# Patient Record
(demographics unavailable — no encounter records)

---

## 2018-03-09 NOTE — MRI
MRI CERVICAL SPINE WITHOUT CONTRAST:

 

History 

Back pain.  Bilateral arm and shoulder pain.

 

COMPARISON: 

6/24/16.

 

TECHNIQUE: 

MRI cervical spine is performed without intravenous Gadolinium administration.  Multisequential, mult
iplanar imaging is performed.

 

FINDINGS: 

Evaluation is limited by motion degradation.

 

Appropriate T1 marrow signal intensity of the cervical vertebrae.  Cervical spine vertebral body heig
ht is maintained.  No significant STIR hyperintensity to suggest edema or ligamentous injury.

 

Visualized brain parenchyma, cervicomedullary junction, cervical cord, and the upper thoracic cord ha
ve an overall normal size and signal intensity.

 

C2-C3:  No significant disk-osteophyte complex.  No significant central canal stenosis.  Right neural
 foramen is mildly narrowed due to degenerative change of the uncovertebral joint.  The left neural f
oramen is patent.

 

C3-C4:  Broad-based disk-osteophyte complex abuts the thecal sac.  Ventral subarachnoid space is near
ly effaced.  Mild deformity of the midline cervical cord, without T2 hyperintensity of the cord.  Mil
d central canal stenosis.  Degenerative change of bilateral uncovertebral joints results in moderate 
bilateral foraminal narrowing.

 

C4-C5:  Central disk-osteophyte complex abuts the thecal sac.  The ventral subarachnoid space is main
tained.  Mild central canal stenosis.  No T2 hyperintensity of the cord.  Degenerative change of bila
teral uncovertebral joints results in mild bilateral foraminal narrowing.

 

C5-C6:  Central disk-osteophyte complex abuts the thecal sac.  Mild central canal stenosis.  Degenera
tive change of bilateral uncovertebral joints results in mild to moderate bilateral foraminal narrowi
ng.

 

C6-C7:  Generalized disk-osteophyte complex without significant central canal stenosis.  Degenerative
 change of bilateral uncovertebral joints results in moderate bilateral foraminal narrowing.

 

C7-T1:  No significant central canal stenosis.  Neural foramen are patent.

 

IMPRESSION: 

Limited evaluation due to motion degradation.  Degenerative changes in the cervical spine as above.

 

POS: [facility]

## 2018-10-19 NOTE — BD
DEXA BONE DENSITY STUDY:

 

HISTORY: 

A 79-year-old female with a history of osteoporosis.

 

Lumbar Spine:       BMD (g/cm2)

    L1                1.043              T-Score: +0.5

    L2                1.129              T-Score: +0.9

    L3                1.232              T-Score: +1.3

    L4                1.274              T-Score: +1.9

 

    L1-L4             1.180              T-Score: +1.2

 

Within normal limits with no increased risk for fracture, bone mineral density has decreased 8.7% fro
m the prior study of 12/22/2015.

 

Femoral Neck:         0.779              T-Score: -0.6

 

Total Femur:          1.027              T-Score: +0.7

 

Within normal limits with no increased risk for fracture, bone mineral density has decreased 5.4% fro
m the prior 12/22/2015.

 

FRAX score major osteoporotic fracture 9.7%, hip fracture 1.5%.

 

POS: [facility]

## 2018-10-31 NOTE — HP
HISTORY OF PRESENT ILLNESS:  The patient is a 79-year-old white female who underwent left total knee 
replacement for osteonecrosis of the left knee in 07/2017.  She initially did well.  At 1 year follow
up she was asymptomatic, was quite happy with the results.  Approximately 1 month ago she had a massa
ge and several days later noticed pain in her left knee.  She also relates that approximately 2 weeks
 prior to this, she did have a dental cleaning without antibiotic prophylaxis.  She was subsequently 
seen in my office and the knee was slightly warm and slightly red with an effusion.  Peripheral white
 count was normal, but her sedimentation rate was elevated at 68 and CRP was elevated at 4.14.  Subse
quent aspirate of the knee revealed an intra-articular white count of 9300 with 83 segs.  A positive 
Synasure test consistent with periprosthetic infection.  Cultures did not grow anything.  She has con
tinued to have pain and swelling and feels there has been a definite change since earlier in the summ
er.  It is felt that she does have an infection and is admitted at this time for a revision.

 

PAST MEDICAL HISTORY:  Please see the old chart.  The patient also has steroid dependent autoimmune e
ncephalitis and has been on prednisone for some time 0 mg daily.  She also has a history of hypertens
ion, thyroid disease, neurogenic bladder, a previous hysterectomy and cholecystectomy.

 

CURRENT MEDICATIONS:  Synthroid, Lexapro, pantoprazole, multivitamins, Lasix, valsartan, prednisone, 
aspirin 81 mg daily and gabapentin.

 

ALLERGIES:  She is allergic to SULFA and LEVAQUIN.

 

FAMILY HISTORY/SOCIAL HISTORY/REVIEW OF SYSTEMS:  Essentially unremarkable.  The patient is still usi
ng a walker or cane following her surgery because of altered balance from her encephalitis.

 

PHYSICAL EXAMINATION:

GENERAL:  Reveals a healthy female.

HEENT:  Unremarkable.

NECK:  Supple.

CHEST:  Clear.

HEART:  Regular rate and rhythm.

ABDOMEN:  Soft, nontender.

PELVIC/RECTAL/BREAST:  Exams are deferred.

EXTREMITIES:  Pertinent findings related to the left knee.  There is a trace effusion.  Slight anteri
or erythema.  There is some mild global tenderness.  The surgical wound is well healed.  She is able 
to do a good straight leg raise and has range of motion of 0-120 degrees.  There is no instability.  
Neurovascular exam is intact.

 

X-RAY FINDINGS:  X-rays reveal satisfactory alignment of the components.  No obvious signs of looseni
ng and no obvious signs of osteomyelitis.  

 

Blood studies and arthrocentesis as noted above. 

 

IMPRESSION:  

1.  Infected left total knee replacement.

2.  History of steroid dependent autoimmune encephalitis.

3.  History of hypertension.

4.  History of thyroid replacement.

 

PLAN:  Left total knee revision with removal of the implants and placement of antibiotic spacers.  We
 will not give preoperative antibiotics and will obtain deep tissue cultures and also obtain Infectio
us Disease consultation postoperatively.  The nature of the surgery, length of recovery, and potentia
l complications such as persistent infection, persistent pain, loss of motion, thromboembolic phenome
na, neurovascular injury, possible transfusion, fracture, and need for additional treatment or possib
le revision of the spacers has been discussed in detail with the patient and her family.

## 2018-11-01 NOTE — PDOC.PN
- Subjective


Encounter Start Date: 11/01/18


Encounter Start Time: 19:56


Subjective: seen & examined.Care discussed w family at bedside


-: s/p L TKR for Infection of prosthesis.Feels OK except for pain at surgical 


-: no N/V.no CP/SOB





PMH of HTN,hypothyroidism,Autoimmune encephalitis on Prednisone





- Objective


MAR Reviewed: Yes


Vital Signs & Weight: 


 Vital Signs (12 hours)











  Temp Pulse Resp BP Pulse Ox


 


 11/01/18 16:00  97.7 F  92  18  153/68 H  96








 Weight











Weight                         195 lb














I&O: 


 











 10/31/18 11/01/18 11/02/18





 06:59 06:59 06:59


 


Intake Total   750


 


Output Total   400


 


Balance   350











Additional Labs: 





Microbiology





11/01/18 12:03   Knee - E swab   Acid Fast Bacilli Smear - Final


11/01/18 12:03   Knee - E swab   Acid Fast Bacilli Culture - Final


11/01/18 12:03   Knee - E swab   Acid Fast Bacilli Smear - Final


11/01/18 12:03   Knee - E swab   Acid Fast Bacilli Culture - Final


11/01/18 12:03   Knee - Tissue   Bacterial Culture - Preliminary


11/01/18 12:03   Knee - Tissue   Bacterial Culture - Preliminary


11/01/18 12:03   Knee - E swab   Bacterial Culture - Preliminary


11/01/18 12:03   Knee - E swab   Bacterial Culture - Preliminary











Phys Exam





- Physical Examination


Constitutional: NAD


HEENT: PERRLA, moist MMs, sclera anicteric, oral pharynx no lesions


Neck: no nodes, no JVD, supple, full ROM


Respiratory: no wheezing, no rales, no rhonchi, clear to auscultation bilateral


Cardiovascular: RRR, no significant murmur, no rub


Gastrointestinal: soft, non-tender, no distention, positive bowel sounds


Musculoskeletal: pulses present, edema present (mild pedal edema)


Neurological: non-focal, normal sensation, moves all 4 limbs


Psychiatric: normal affect, A&O x 3


Skin: no rash





Dx/Plan


(1) SHARLENE (acute kidney injury)


Code(s): N17.9 - ACUTE KIDNEY FAILURE, UNSPECIFIED   Status: Acute   





(2) Infected prosthetic knee joint


Code(s): T84.59XA - INFECT/INFLM REACTION DUE TO OTH INTERNAL JOINT PROSTH, INIT

; Z96.659 - PRESENCE OF UNSPECIFIED ARTIFICIAL KNEE JOINT   Status: Acute   





(3) HTN (hypertension)


Code(s): I10 - ESSENTIAL (PRIMARY) HYPERTENSION   Status: Chronic   





(4) Hypothyroid


Code(s): E03.9 - HYPOTHYROIDISM, UNSPECIFIED   Status: Chronic   





(5) H/O autoimmune encephalitis


Status: Chronic   Comment: Continue Prednisone   





- Plan


plan discussed w/ family, continue antibiotics, PT/OT, respiratory therapy, 

incentive spirometry, out of bed/ambulate, DVT proph w/SCDs


Agree w broad spectrum IV ABx. ID consulted as well


-: S/P left knee revision.


-: HD stable


-: Hold lasix tonight as Cr high yesterday.cont IVF.recheck in am


-: Home meds as started. Pain control.Rehab





* .IM team will follow








Review of Systems





- Review of Systems


Constitutional: negative: fever, chills, sweats, weakness, malaise, other


ENT: negative: Ear Pain, Ear Discharge, Nose Pain, Nose Discharge, Nose 

Congestion, Mouth Pain, Mouth Swelling, Throat Pain, Throat Swelling, Other


Respiratory: negative: Cough, Dry, Shortness of Breath, Hemoptysis, SOB with 

Excertion, Pleuritic Pain, Sputum, Wheezing


Cardiovascular: negative: chest pain, palpitations, orthopnea, paroxysmal 

nocturnal dyspnea, edema, light headedness, other


Gastrointestinal: negative: Nausea, Vomiting, Abdominal Pain, Diarrhea, 

Constipation, Melena, Hematochezia, Other


Genitourinary: negative: Dysuria, Frequency, Incontinence, Hematuria, Retention

, Other


Musculoskeletal: Leg Pain.  negative: Neck Pain, Shoulder Pain, Arm Pain, Back 

Pain, Hand Pain, Foot Pain, Other


Neurological: negative: Weakness, Numbness, Incoordination, Change in Speech, 

Confusion, Seizures, Other





- Medications/Allergies


Allergies/Adverse Reactions: 


 Allergies











Allergy/AdvReac Type Severity Reaction Status Date / Time


 


levofloxacin [From Levaquin] Allergy Intermediate Rash Verified 10/31/18 11:00


 


Sulfa (Sulfonamide Allergy Intermediate Rash Verified 10/31/18 11:00





Antibiotics)     











Medications: 


 Current Medications





Acetaminophen (Tylenol)  1,000 mg PO Q6H PRN


   PRN Reason: Mild Pain (1-3)


Hydrocodone Bitart/Acetaminophen (Norco 10/325)  1 tab PO Q4H PRN


   PRN Reason: Pain (1-3)


   Last Admin: 11/01/18 16:32 Dose:  1 tab


Hydrocodone Bitart/Acetaminophen (Norco 10/325)  2 tab PO Q4H PRN


   PRN Reason: PAIN (4-6)


Aspirin (Ecotrin)  81 mg PO BID [facility]


Benzonatate (Tessalon)  100 mg PO Q6H PRN


   PRN Reason: Cough


Bisacodyl (Dulcolax)  10 mg LA DAILYPRN PRN


   PRN Reason: Constipation


Cholecalciferol (Vitamin D3)  2,000 units PO DAILY [facility]


Clonidine (Catapres)  0.1 mg PO Q4H PRN


   PRN Reason: SBP >160 ____


Escitalopram Oxalate (Lexapro)  20 mg PO DAILY [facility]


Fentanyl (Sublimaze)  50 mcg IV Q1H PRN


   PRN Reason: Breakthrough Pain


   Last Admin: 11/01/18 18:35 Dose:  50 mcg


Ferrous Gluconate (Fergon)  324 mg PO BID-[facility]


Guaifenesin (Robitussin Sf)  200 mg PO Q4H PRN


   PRN Reason: Cough


Hydralazine HCl (Apresoline)  10 mg SLOW IVP Q4H PRN


   PRN Reason: SBP > 180 and HR < 70


Ropivacaine 250 ml/ Device  250 mls @ 10 mls/hr NERVE BLCK INF [facility]


Sodium Chloride (Normal Saline 0.9%)  1,000 mls @ 100 mls/hr IV .Q10H [facility]


   Last Admin: 11/01/18 16:29 Dose:  1,000 mls


Vancomycin HCl 1.5 gm/ Sodium (Chloride)  300 mls @ 200 mls/hr IVPB Q12HR [facility]


Cefazolin Sodium/Dextrose 2 gm (/ Device)  50 mls @ 100 mls/hr IVPB 0400,1200,

2000 [facility]


Iron/Minerals/Multivitamins (Theragran M)  1 tab PO DAILY [facility]


Levothyroxine Sodium (Synthroid)  100 mcg PO 0600 [facility]


Magnesium Hydroxide (Milk Of Magnesium)  30 ml PO DAILYPRN PRN


   PRN Reason: Constipation


Nitroglycerin (Nitrostat)  0.4 mg SL Q5MIN PRN


   PRN Reason: Chest Pain


Olmesartan (Benicar)  40 mg PO DAILY [facility]


Ondansetron HCl (Zofran)  4 mg IVP Q6H PRN


   PRN Reason: Nausea/Vomiting


Ondansetron HCl (Zofran)  4 mg IM Q6H PRN


   PRN Reason: Nausea/Vomiting


Pantoprazole Sodium (Protonix)  40 mg PO 2100 [facility]


Prednisone (Prednisone)  10 mg PO DAILY [facility]


Promethazine HCl (Phenergan)  12.5 mg IM Q4H PRN


   PRN Reason: Nausea


Senna/Docusate Sodium (Senokot S)  2 tab PO BID [facility]


Sodium Biphosphate/Sodium Phosphate (Fleet Enema)  133 ml LA DAILYPRN PRN


   PRN Reason: Constipation


Sodium Chloride (Flush - Normal Saline)  10 ml IVF PRN PRN


   PRN Reason: Saline Flush


Sodium Chloride (Ocean Nasal Spray 0.65%)  0 ml EA NARE QIDPRN PRN


   PRN Reason: Nasal Congestion


Throat Lozenges (Cepastat Lozenges)  1 natalie PO Q2H PRN


   PRN Reason: Sore Throat


Tramadol HCl (Ultram)  50 mg PO Q6H PRN


   PRN Reason: Mild Pain (1-3)


Tramadol HCl (Ultram)  100 mg PO Q6H PRN


   PRN Reason: Moderate Pain 4-6


Zolpidem Tartrate (Ambien)  5 mg PO HSPRN PRN


   PRN Reason: Insomnia

## 2018-11-01 NOTE — OP
DATE OF PROCEDURE:  11/01/2018

 

SURGEON:  Amrik Ramírez M.D.

 

ASSISTANT:  Yoseph Gordon M.D. and also HERMANN Ventura (for wound closure).

 

PREOPERATIVE DIAGNOSIS:  Infected left total knee replacement.

 

POSTOPERATIVE DIAGNOSIS:  Infected left total knee replacement.

 

PROCEDURE:  Left total knee revision with antibiotic articulated spacers with 4 
[place] Triathlon femoral component and #4 [place] Triathlon polyethylene 
tibial component, 13 mm thickness.

 

INDICATIONS:  The patient is approximately 15 months status post left total 
knee replacement, which initially was uneventful.  Several weeks ago, she 
developed signs and symptoms of low-grade infection, which was documented by 
blood studies and arthrocentesis.  Blood culture was negative, but no other 
signs pointing to infection.  She is admitted at this time for revision.  So, 
planned to revise the knee with articulated antibiotic impregnated spacers, and 
if she is functional with this, we will leave this in as a single-stage 
revision.  If this fails or does not stay secured, she may require a more 
definitive revision, once the infection is totally eradicated.

 

NARRATIVE REPORT:  After satisfactory anesthesia was induced in supine position
, the patient was prepped and draped in routine manner.  Sequential compression 
device was used on the non-operative leg throughout the procedure.  Antibiotics 
were not given prior to the incision, waiting on obtaining definitive deep 
cultures.  The left leg was elevated, exsanguinated with an Esmarch bandage, 
and tourniquet inflated to 300 mmHg.  The previous medial parapatellar incision 
was reopened in line with the previous incision and slightly extended 
proximally and distally.  This was carried down subcutaneous tissues.  Bleeding 
points controlled appropriately with cautery.  Medial parapatellar arthrotomy 
was performed.  There was cloudy chronic inflamed tissue and fluid was 
obtained.  Fluid and deep cultures of the synovium were sent for Gram stain, 
aerobic and anaerobic cultures, fungal cultures, and AFB cultures.  Medial 
parapatellar arthrotomy was performed.  Patella still subluxed laterally.  A 
subtotal synovectomy was performed using sharp and blunt dissection.  The 
femoral component was then removed with the use of a reciprocal saw and 
osteotomes, taking care to preserve as much bone as possible; it was removed 
without too much difficulty.  There was no gross loosening.  The tibial 
polyethylene was snapped into the tray and the tibial tray removed in a similar 
fashion.  It was considered more difficult, because of the cemented stem of the 
component, but this was accomplished.  There was a very slight fragmentation of 
the anterior lateral rim of the tibia, but this does not appear to be a major 
problem.  The patellar prosthesis removed with an oscillating saw and the 
remaining pegs removed from the holes with the use of a drill bit and curette.  
The remaining of the wound was inspected.  Any additional inflammatory tissue 
and synovitis was widely excised, appeared to be satisfactory debridement.  The 
wound was thoroughly irrigated with pulsatile lavage.  A trial reduction with a 
4 Triathlon femoral component and a #4 tibial baseplate with 13 mm thickness 
gave appropriate size, fit, and stability.  I elected to not replace the 
patellar component and leave it as is.  The trial components removed.  The knee 
was again copiously irrigated.  The tibial and femoral components were then 
cemented in separate stages.  Each component was cemented in with 1 package of 
cement premixed with 2 grams of tobramycin powder and 3 grams of vancomycin 
powder.  The cement was allowed to get to a doughy stage and it was cemented 
with a quite doughy attempting not to get a firm cement mantle.  Excess cement 
was removed.  There again appeared to be good fit and stability of the 
components.  The knee was placed in approximately 10 degrees of flexion.  This 
was felt to be acceptable to allow for further stability and prevent _____ 
postoperatively.  The wound was again copiously irrigated.  The medial 
retinaculum and quadriceps mechanism were closed with interrupted #2 Vicryl and 
a running #2 Quill.  Subcutaneous tissues were closed with running 0 Quill 
suture and the skin closed with staple gun.  Sterile bulky compressive dressing 
was applied and the tourniquet deflated after 95 minutes.  The foot promptly 
pinked up.  Sequential compression device was applied to the operated leg and 
she was placed into the nail immobilizer, awakened, and taken to recovery room 
in stable condition.  There were no apparent intraoperative complications.  The 
estimated blood loss was negligible.

 

It should be mentioned that, after the cultures were obtained, she was given 2 
grams of IV Ancef and 1.5 grams of IV vancomycin.  It is planned to continue 
these postoperatively, pending culture results.

 

MAY[facility]

## 2018-11-01 NOTE — RAD
TWO VIEWS OF THE LEFT KNEE:

 

COMPARISON: 

6/26/2017.

 

HISTORY: 

Status post knee arthroplasty.

 

FINDINGS: 

Two views of the left knee show the patient to be status post left knee arthroplasty without perihard
ware lucency or fracture.  The tibial portion of the prosthesis has been removed.  Air in the soft ti
ssues and overlying skin staples are from recent surgery.

 

IMPRESSION: 

Removal of tibial portion of the prosthesis.

 

POS: [facility]

## 2018-11-02 NOTE — PDOC.PN
- Subjective


Encounter Start Date: 11/02/18


Encounter Start Time: 14:56


Subjective: no new complaints


-: still w some Leg pain at surgical site





- Objective


MAR Reviewed: Yes


Vital Signs & Weight: 


 Vital Signs (12 hours)











  Temp Pulse Resp BP BP BP Pulse Ox


 


 11/02/18 12:18  98.4 F  84  18    122/70  94 L


 


 11/02/18 08:47     128/74  118/66  


 


 11/02/18 08:46     128/74  118/66  


 


 11/02/18 08:43        95


 


 11/02/18 08:28  98.1 F  72  17    111/65  95


 


 11/02/18 04:54  98.2 F  71  18    131/78  98














  Pulse Ox


 


 11/02/18 12:18 


 


 11/02/18 08:47  93 L


 


 11/02/18 08:46  93 L


 


 11/02/18 08:43 


 


 11/02/18 08:28 


 


 11/02/18 04:54 








 Weight











Weight                         195 lb














I&O: 


 











 11/01/18 11/02/18 11/03/18





 06:59 06:59 06:59


 


Intake Total  750 


 


Output Total  400 


 


Balance  350 











Result Diagrams: 


 11/02/18 04:27





 11/02/18 04:27


Additional Labs: 





 Laboratory Tests











  10/31/18 11/02/18





  11:38 04:27


 


Hgb  11.3 L  9.6 L














Phys Exam





- Physical Examination


Constitutional: NAD


HEENT: PERRLA, moist MMs, sclera anicteric, oral pharynx no lesions


Neck: no nodes, no JVD, supple, full ROM


Respiratory: no wheezing, no rales, no rhonchi, clear to auscultation bilateral


Cardiovascular: RRR, no significant murmur, no rub


Gastrointestinal: soft, non-tender, no distention, positive bowel sounds


Musculoskeletal: no edema, pulses present


Neurological: non-focal, normal sensation, moves all 4 limbs


Psychiatric: normal affect, A&O x 3


Skin: no rash





Dx/Plan


(1) SHARLENE (acute kidney injury)


Code(s): N17.9 - ACUTE KIDNEY FAILURE, UNSPECIFIED   Status: Acute   





(2) Infected prosthetic knee joint


Code(s): T84.59XA - INFECT/INFLM REACTION DUE TO OTH INTERNAL JOINT PROSTH, INIT

; Z96.659 - PRESENCE OF UNSPECIFIED ARTIFICIAL KNEE JOINT   Status: Acute   





(3) HTN (hypertension)


Code(s): I10 - ESSENTIAL (PRIMARY) HYPERTENSION   Status: Chronic   





(4) Hypothyroid


Code(s): E03.9 - HYPOTHYROIDISM, UNSPECIFIED   Status: Chronic   





(5) H/O autoimmune encephalitis


Status: Chronic   Comment: Continue Prednisone   





- Plan


PT/OT, DVT proph w/SCDs


restart lasix. renal Fx better.monitor


-: H/H lower-likley post op blood loss anemia.monitor


-: Home meds as below.HD stable


-: IV ABx. ID recs 





* .








Review of Systems





- Review of Systems


Constitutional: negative: fever, chills, sweats, weakness, malaise, other


Respiratory: negative: Cough, Dry, Shortness of Breath, Hemoptysis, SOB with 

Excertion, Pleuritic Pain, Sputum, Wheezing


Cardiovascular: negative: chest pain, palpitations, orthopnea, paroxysmal 

nocturnal dyspnea, edema, light headedness, other


Gastrointestinal: negative: Nausea, Vomiting, Abdominal Pain, Diarrhea, 

Constipation, Melena, Hematochezia, Other


Genitourinary: negative: Dysuria, Frequency, Incontinence, Hematuria, Retention

, Other


Musculoskeletal: negative: Neck Pain, Shoulder Pain, Arm Pain, Back Pain, Hand 

Pain, Leg Pain, Foot Pain, Other


Neurological: negative: Weakness, Numbness, Incoordination, Change in Speech, 

Confusion, Seizures, Other





- Medications/Allergies


Allergies/Adverse Reactions: 


 Allergies











Allergy/AdvReac Type Severity Reaction Status Date / Time


 


levofloxacin [From Levaquin] Allergy Intermediate Rash Verified 10/31/18 11:00


 


Sulfa (Sulfonamide Allergy Intermediate Rash Verified 10/31/18 11:00





Antibiotics)     











Medications: 


 Current Medications





Acetaminophen (Tylenol)  1,000 mg PO Q6H PRN


   PRN Reason: Mild Pain (1-3)


Hydrocodone Bitart/Acetaminophen (Norco 10/325)  1 tab PO Q4H PRN


   PRN Reason: Pain (1-3)


   Last Admin: 11/01/18 16:32 Dose:  1 tab


Hydrocodone Bitart/Acetaminophen (Norco 10/325)  2 tab PO Q4H PRN


   PRN Reason: PAIN (4-6)


   Last Admin: 11/02/18 08:43 Dose:  2 tab


Aspirin (Ecotrin)  81 mg PO BID [facility]


   Last Admin: 11/02/18 08:38 Dose:  81 mg


Benzonatate (Tessalon)  100 mg PO Q6H PRN


   PRN Reason: Cough


Bisacodyl (Dulcolax)  10 mg IA DAILYPRN PRN


   PRN Reason: Constipation


Cholecalciferol (Vitamin D3)  2,000 units PO DAILY [facility]


   Last Admin: 11/02/18 08:38 Dose:  2,000 units


Clonidine (Catapres)  0.1 mg PO Q4H PRN


   PRN Reason: SBP >160 ____


Escitalopram Oxalate (Lexapro)  20 mg PO DAILY [facility]


   Last Admin: 11/02/18 08:38 Dose:  20 mg


Fentanyl (Sublimaze)  50 mcg IV Q1H PRN


   PRN Reason: Breakthrough Pain


   Last Admin: 11/02/18 04:42 Dose:  50 mcg


Ferrous Gluconate (Fergon)  324 mg PO BID-[facility]


   Last Admin: 11/02/18 08:38 Dose:  324 mg


Furosemide (Lasix)  20 mg PO DAILY [facility]


Guaifenesin (Robitussin Sf)  200 mg PO Q4H PRN


   PRN Reason: Cough


Hydralazine HCl (Apresoline)  10 mg SLOW IVP Q4H PRN


   PRN Reason: SBP > 180 and HR < 70


Ropivacaine 250 ml/ Device  250 mls @ 10 mls/hr NERVE BLCK INF [facility]


   Last Admin: 11/02/18 14:30 Dose:  250 mls


Sodium Chloride (Normal Saline 0.9%)  1,000 mls @ 100 mls/hr IV .Q10H [facility]


   Last Admin: 11/02/18 07:32 Dose:  1,000 mls


Cefazolin Sodium/Dextrose 2 gm (/ Device)  50 mls @ 100 mls/hr IVPB 0400,1200,

2000 [facility]


   Last Admin: 11/02/18 12:07 Dose:  50 mls


Vancomycin HCl 1.5 gm/ Sodium (Chloride)  300 mls @ 200 mls/hr IVPB Q24HR [facility]


Iron/Minerals/Multivitamins (Theragran M)  1 tab PO DAILY [facility]


   Last Admin: 11/02/18 08:36 Dose:  1 tab


Levothyroxine Sodium (Synthroid)  100 mcg PO 0600 [facility]


   Last Admin: 11/02/18 04:43 Dose:  100 mcg


Magnesium Hydroxide (Milk Of Magnesium)  30 ml PO DAILYPRN PRN


   PRN Reason: Constipation


Nitroglycerin (Nitrostat)  0.4 mg SL Q5MIN PRN


   PRN Reason: Chest Pain


Olmesartan (Benicar)  40 mg PO DAILY [facility]


   Last Admin: 11/02/18 08:38 Dose:  40 mg


Ondansetron HCl (Zofran)  4 mg IVP Q6H PRN


   PRN Reason: Nausea/Vomiting


Ondansetron HCl (Zofran)  4 mg IM Q6H PRN


   PRN Reason: Nausea/Vomiting


Pantoprazole Sodium (Protonix)  40 mg PO 2100 [facility]


   Last Admin: 11/01/18 20:47 Dose:  40 mg


Prednisone (Prednisone)  10 mg PO DAILY [facility]


   Last Admin: 11/02/18 08:36 Dose:  10 mg


Promethazine HCl (Phenergan)  12.5 mg IM Q4H PRN


   PRN Reason: Nausea


Senna/Docusate Sodium (Senokot S)  2 tab PO BID [facility]


   Last Admin: 11/02/18 08:38 Dose:  2 tab


Sodium Biphosphate/Sodium Phosphate (Fleet Enema)  133 ml IA DAILYPRN PRN


   PRN Reason: Constipation


Sodium Chloride (Flush - Normal Saline)  10 ml IVF PRN PRN


   PRN Reason: Saline Flush


Sodium Chloride (Ocean Nasal Spray 0.65%)  0 ml EA NARE QIDPRN PRN


   PRN Reason: Nasal Congestion


Throat Lozenges (Cepastat Lozenges)  1 natalie PO Q2H PRN


   PRN Reason: Sore Throat


Tramadol HCl (Ultram)  50 mg PO Q6H PRN


   PRN Reason: Mild Pain (1-3)


Tramadol HCl (Ultram)  100 mg PO Q6H PRN


   PRN Reason: Moderate Pain 4-6


Zolpidem Tartrate (Ambien)  5 mg PO HSPRN PRN


   PRN Reason: Insomnia

## 2018-11-02 NOTE — SPC
ULTRASOUND GUIDED LEFT UPPER EXTREMITY PICC PLACEMENT:

 

DATE: 11/2/2018.

 

HISTORY: 

Knee infection in need of IV antibiotics.

 

FINDINGS: 

Informed consent was obtained prior to the procedure.

 

Left antecubital fossa prepped and draped in normal sterile fashion.  The skin overlying the left bas
ilic vein anesthetized with 1% buffered Lidocaine.  With direct sonographic guidance, vascular access
 is obtained via the left basilic vein and an 0.018 wire is advanced to the IVC, retracted to the cav
oatrial junction.  Intravascular length is calculated at 41 cm and the PICC was cut accordingly.  The
 needle was removed and replaced with a peelaway sheath.  The PICC is advanced over the wire.  The wi
re and peelaway sheath were removed.  The tip of the catheter overlies the cavoatrial junction.  Both
 ports flush well and the catheter is ready for use.

 

EXPOSURE DATA:

0.3 minutes fluoroscopic time, 2913 mGy*^cm2.

 

IMPRESSION: 

Successful ultrasound-guided left upper extremity PICC placement.

 

POS: OTF

## 2018-11-02 NOTE — CON
DATE OF CONSULTATION:  11/02/2018

 

REASON FOR CONSULTATION:  Left TKR infection.

 

HISTORY OF PRESENT ILLNESS:  A 79-year-old whom I had seen in the past when she presented with enceph
alitis.  She had a negative ID workup and a clear-cut response to corticosteroid administration with 
then the presumption of an autoimmune encephalitis.  Her CSF then showed a 101 WBCs with predominance
 of neutrophils, VDRL was nonreactive and the Bartonella HSV histoplasma, West Nile and Borrelia work
up was negative.  Cryptococcus antigen was negative as well.  No assays for other forms of encephalit
is, particularly arboviral encephalitides was done.  The prevalence of other arboviral encephalitides
 is less significant in the state of [place] and more common in northern latitudes.  The patient after 
continuation of corticosteroids did well, but attempts at decreasing the steroids and discontinuing h
ave been met with problems in terms of patient feeling weak.  About one year and a half ago she was d
iagnosed with osteonecrosis of the left knee and she required a knee replacement which was uneventful
 and she did well until recently when she developed inflammatory changes.  She was seen in Dr. Ramírez'
s office and an effusion was identified.  Sed rate was high.  CRP was 4.14, the aspirate showed 9300 
WBCs with 82% segmented neutrophils, alpha defensin test was positive.    With those results and mind
, Dr. Ramírez remove the implants and we are awaiting on cultures at this point in time.  The patient i
s awake.  Denies any headaches, no change in visual symptoms, sore throat, odynophagia, dysphagia.  N
o dyspnea or chest pain, no cough, no back pain.  No abdominal pain, diarrhea, no genitourinary sympt
oms.  She voids without difficulty.  No neurological symptoms.

 

PAST MEDICAL HISTORY:  Autoimmune encephalitis, osteonecrosis left knee, left knee replacement, hypot
hyroidism, neurogenic bladder, hysterectomy, cholecystectomy, hypertension.

 

ALLERGIES:  SULFA DRUGS and LEVAQUIN.

 

FAMILY HISTORY:  Noncontributory.

 

CURRENT MEDICATIONS:  Tylenol, hydrocodone, Ecotrin, Tessalon, Dulcolax, cefazolin, clonidine, Sublim
aze, levofloxacin, olmesartan, pantoprazole and vancomycin.

 

PHYSICAL EXAMINATION:

VITAL SIGNS:  T-max 98.4, blood pressure 120/70, pulse 84, respirations 18, O2 sat 94% on 2 liters na
sal cannula.

SKIN:  Noncontributory.  Peripheral IV access.  No lymphadenopathy.

HEENT:  Ocular movements conjugate, a little bit of periorbital edema.  Oral cavity normal.

NECK:  Supple.

LUNGS:  Symmetric clear breath sounds.

HEART:  S1, S2, regular rate.  No S3 or S4.

ABDOMEN:  Soft, not distended or tender.  No ascites.  

:  No bladder distention.  She has a Anderson catheter in place. 

EXTREMITIES:  Left knee with a splint which was not removed.  She does not have a drain in place.  Pu
lses 1+ in dorsalis pedis.  She is able to move toes and is able to lift hands from bed.  

NEURO:  She is oriented, follows commands, fairly decent recollection.  Speech appears to be normal.

 

LABORATORY DATA:  White cell count 9.0, hemoglobin 9.6, platelets 244.  Sodium 140, creatinine 1.03, 
glucose 76.  The last urinalysis is from 10/31/2018 with 0-3 WBCs, positive nitrites.  Synovial fluid
 with 9300 WBCs, a predominance of mature neutrophils.  The cultures are pending including AFB and fu
ngal cultures.

 

IMAGING STUDIES:  There is a knee x-ray with removal of the tibial portion of the prosthesis.

 

ASSESSMENT AND DISCUSSION:  History of autoimmune encephalitis on steroids, osteonecrosis left tibia.
  Inflammatory changes noted with a positive alpha defensin test.  The tibial component has been royal
neo.  It is not clear that the femoral component was removed as well.  It looks like both femoral and
 tibial component removed.

 

DISCUSSION:  The patient will have the usual management, awaiting on the culture results and then adj
ust antimicrobial therapy.  PICC line placement treat for a protracted period of time, after that the
n transition to oral suppressive therapy.  The patient to keep the spacer as long as she can tolerate
 it with decent functional results.  Discussed potential adverse reactions from treatment.  The patie
nt and family understood and agreed to management recommendations.

## 2018-11-04 NOTE — PRG
DATE OF SERVICE:  11/04/2018

 

SUBJECTIVE:  Cannot sleep very well earlier.  This was late as 8 and then she was fatigued and was ab
le to sleep a little, this morning feels somewhat better.  No headaches, no visual symptoms, may be s
ometimes she gets some blurred vision temporarily.  No sore throat, odynophagia, dysphagia.  No dyspn
ea or chest pain, no abdominal pain, has a Anderson catheter.  Mild pain in the involved knee.

 

PHYSICAL EXAMINATION:

VITAL SIGNS:  T-max 99.9 yesterday and now is 99.4, blood pressure 170/76, pulse 86, respirations 18,
 O2 sat 96% on 2 liters, little bit of periorbital edema, but does not appear in distress.

HEENT:  Ocular movements conjugate.  Nasal passages patent.  Oral cavity normal.

NECK:  Supple.

LUNGS:  Symmetric clear breath sounds.

CARDIOVASCULAR:  S1, S2, regular rate without murmurs.

ABDOMEN:  Soft, not distended, no bladder distention or organomegaly.  No ascites.  No joint inflamma
tory activity outside the area of involvement.  She is able to wiggle her feet.  She is able to lift 
her hands from the bed and has good  strength.  She is awake, oriented, follows commands.

 

LABORATORY DATA:  White cell count is 9.6, hemoglobin 9.7, platelets 208.  Sodium 137, creatinine 0.8
.  Microbiology with all the samples are pending including acid fast and bacterial.  I am not sure th
at the fungal cultures were submitted.

 

ASSESSMENT AND DISCUSSION:  Prior history of autoimmune encephalitis on low dose steroids with osteon
ecrosis left tibia, status post knee replacement, and now inflammatory changes with a positive alpha 
defensins test with removal of both components.  She has a spacer in place and cultures are thus far 
negative.  The possibility of opportunistic pathogen including fungal and mycobacterial pathogens nee
d to be considered.  In the meantime, continue Rocephin and vancomycin.  If the cultures end up being
 negative, we will continue this regimen for a total of 6 weeks with monitoring of C reactive protein
, CBC and monitoring of the mycobacterial and fungal cultures.  I have to verify with laboratory.  Th
e fungal cultures were actually submitted.

## 2018-11-04 NOTE — PDOC.EVN
Event Note





- Event Note


Event Note: 





Following for medical management


Bp still high.restart AM Norvasc.


replace Potassium.


Follow H/H.


Home meds as before

## 2018-11-05 NOTE — PDOC.PN
- Subjective


Encounter Start Date: 11/05/18


Encounter Start Time: 11:47


Subjective: feels a little better.sat at the side of bed .


-: has rash under breasts,underarm are & in groin


-: no fever/chills/n/v/d





- Objective


MAR Reviewed: Yes


Vital Signs & Weight: 


 Vital Signs (12 hours)











  Temp Pulse Resp BP Pulse Ox


 


 11/05/18 10:14      93 L


 


 11/05/18 07:25  98.4 F  81  22 H  176/77 H  91 L


 


 11/05/18 04:38  98.2 F  83  16  154/80 H  95


 


 11/05/18 00:00  99.3 F  85  16  164/83 H  95








 Weight











Weight                         195 lb














I&O: 


 











 11/04/18 11/05/18 11/06/18





 06:59 06:59 06:59


 


Intake Total  350 730


 


Output Total  1350 1400


 


Balance  -1000 -670











Result Diagrams: 


 11/05/18 04:54





 11/05/18 04:54


Additional Labs: 





Microbiology





11/01/18 12:03   Knee - Tissue   Acid Fast Bacilli Smear - Final


11/01/18 12:03   Knee - Tissue   Acid Fast Bacilli Smear - Final


11/01/18 12:03   Knee - E swab   Acid Fast Bacilli Smear - Final


11/01/18 12:03   Knee - E swab   Acid Fast Bacilli Culture - Final


11/01/18 12:03   Knee - E swab   Acid Fast Bacilli Smear - Final


11/01/18 12:03   Knee - E swab   Acid Fast Bacilli Culture - Final


11/01/18 12:03   Knee - Tissue   Bacterial Culture - Preliminary


11/01/18 12:03   Knee - Tissue   Anaerobic Culture - Preliminary


11/01/18 12:03   Knee - Tissue   Bacterial Culture - Preliminary


11/01/18 12:03   Knee - Tissue   Anaerobic Culture - Preliminary


11/01/18 12:03   Knee - Tissue   Bacterial Culture - Preliminary


11/01/18 12:03   Knee - Tissue   Anaerobic Culture - Preliminary


11/01/18 12:03   Knee - E swab   Bacterial Culture - Preliminary


11/01/18 12:03   Knee - E swab   Anaerobic Culture - Preliminary


11/01/18 12:03   Knee - E swab   Bacterial Culture - Preliminary


11/01/18 12:03   Knee - E swab   Anaerobic Culture - Preliminary


11/01/18 12:03   Knee - E swab   Bacterial Culture - Preliminary


11/01/18 12:03   Knee - E swab   Anaerobic Culture - Preliminary


11/01/18 12:03   Knee - E swab   Bacterial Culture - Preliminary


11/01/18 12:03   Knee - E swab   Anaerobic Culture - Preliminary





 Laboratory Tests











  10/31/18 11/02/18 11/03/18





  11:38 04:27 05:06


 


Hgb  11.3 L  9.6 L  9.7 L














Phys Exam





- Physical Examination


Constitutional: NAD


HEENT: PERRLA, moist MMs, sclera anicteric, oral pharynx no lesions


Neck: no nodes, no JVD, supple, full ROM


Respiratory: no wheezing, no rales, no rhonchi, clear to auscultation bilateral


Cardiovascular: RRR, no significant murmur, no rub


Gastrointestinal: soft, non-tender, no distention, positive bowel sounds


Musculoskeletal: no edema, pulses present


Neurological: non-focal, normal sensation, moves all 4 limbs


Deviation from normal: candidial rash in groin,under breasts and arm pits





Dx/Plan


(1) Candidiasis of breast


Code(s): B37.89 - OTHER SITES OF CANDIDIASIS   Status: Acute   





(2) Acute blood loss anemia


Code(s): D62 - ACUTE POSTHEMORRHAGIC ANEMIA   Status: Acute   Comment: H/H 

stable.monitor.On FeSO4   





(3) Hypokalemia


Code(s): E87.6 - HYPOKALEMIA   Status: Acute   Comment: replace and recheck   





(4) Infected prosthetic knee joint


Code(s): T84.59XA - INFECT/INFLM REACTION DUE TO OTH INTERNAL JOINT PROSTH, INIT

; Z96.659 - PRESENCE OF UNSPECIFIED ARTIFICIAL KNEE JOINT   Status: Acute   





(5) SHARLENE (acute kidney injury)


Code(s): N17.9 - ACUTE KIDNEY FAILURE, UNSPECIFIED   Status: Resolved   





(6) HTN (hypertension)


Code(s): I10 - ESSENTIAL (PRIMARY) HYPERTENSION   Status: Chronic   





(7) Hypothyroid


Code(s): E03.9 - HYPOTHYROIDISM, UNSPECIFIED   Status: Chronic   





(8) H/O autoimmune encephalitis


Status: Chronic   Comment: Continue Prednisone   





- Plan


plan discussed w/ family, continue antibiotics, DVT proph w/SCDs


Add Topical candidiasis.


-: replace and recheck Potassium


-: renal Fx better.


-: monitor H/H.


-: IM team will follow





* .








Review of Systems





- Review of Systems


Constitutional: weakness.  negative: fever, chills, sweats, malaise, other


ENT: negative: Ear Pain, Ear Discharge, Nose Pain, Nose Discharge, Nose 

Congestion, Mouth Pain, Mouth Swelling, Throat Pain, Throat Swelling, Other


Respiratory: negative: Cough, Dry, Shortness of Breath, Hemoptysis, SOB with 

Excertion, Pleuritic Pain, Sputum, Wheezing


Cardiovascular: negative: chest pain, palpitations, orthopnea, paroxysmal 

nocturnal dyspnea, edema, light headedness, other


Skin: Rash


Neurological: negative: Weakness, Numbness, Incoordination, Change in Speech, 

Confusion, Seizures, Other





- Medications/Allergies


Allergies/Adverse Reactions: 


 Allergies











Allergy/AdvReac Type Severity Reaction Status Date / Time


 


levofloxacin [From Levaquin] Allergy Intermediate Rash Verified 10/31/18 11:00


 


Sulfa (Sulfonamide Allergy Intermediate Rash Verified 10/31/18 11:00





Antibiotics)     











Medications: 


 Current Medications





Acetaminophen (Tylenol)  1,000 mg PO Q6H PRN


   PRN Reason: Mild Pain (1-3)


Hydrocodone Bitart/Acetaminophen (Norco 10/325)  1 tab PO Q4H PRN


   PRN Reason: Pain (1-3)


   Last Admin: 11/05/18 08:46 Dose:  1 tab


Hydrocodone Bitart/Acetaminophen (Norco 10/325)  2 tab PO Q4H PRN


   PRN Reason: PAIN (4-6)


   Last Admin: 11/03/18 09:06 Dose:  2 tab


Acidophilus (Floranex)  1 tab PO DAILY [facility]


   Last Admin: 11/05/18 08:46 Dose:  1 tab


Amlodipine Besylate (Norvasc)  5 mg PO QPM [facility]


   Last Admin: 11/04/18 21:29 Dose:  5 mg


Ascorbic Acid (Vitamin C)  500 mg PO DAILY [facility]


   Last Admin: 11/05/18 08:46 Dose:  500 mg


Aspirin (Ecotrin)  81 mg PO BID [facility]


   Last Admin: 11/05/18 08:45 Dose:  81 mg


Benzonatate (Tessalon)  100 mg PO Q6H PRN


   PRN Reason: Cough


Bisacodyl (Dulcolax)  10 mg KS DAILYPRN PRN


   PRN Reason: Constipation


Calcium Carbonate (Oscal-500)  500 mg PO DAILY [facility]


   Last Admin: 11/05/18 08:45 Dose:  500 mg


Cholecalciferol (Vitamin D3)  2,000 units PO DAILY [facility]


   Last Admin: 11/05/18 08:46 Dose:  2,000 units


Clonidine (Catapres)  0.1 mg PO Q4H PRN


   PRN Reason: SBP >160 ____


   Last Admin: 11/03/18 03:52 Dose:  0.1 mg


Escitalopram Oxalate (Lexapro)  20 mg PO DAILY [facility]


   Last Admin: 11/05/18 08:45 Dose:  20 mg


Fentanyl (Sublimaze)  50 mcg IV Q1H PRN


   PRN Reason: Breakthrough Pain


   Last Admin: 11/02/18 19:42 Dose:  50 mcg


Ferrous Gluconate (Fergon)  324 mg PO BID-[facility]


   Last Admin: 11/05/18 08:45 Dose:  324 mg


[facility]  [facility]


Furosemide (Lasix)  20 mg PO DAILY [facility]


   Last Admin: 11/05/18 08:46 Dose:  20 mg


Gabapentin (Neurontin)  100 mg PO HS [facility]


   Last Admin: 11/04/18 21:29 Dose:  100 mg


Guaifenesin (Robitussin Sf)  200 mg PO Q4H PRN


   PRN Reason: Cough


Hydralazine HCl (Apresoline)  10 mg SLOW IVP Q4H PRN


   PRN Reason: SBP > 170 and HR < 70


Ropivacaine 250 ml/ Device  250 mls @ 10 mls/hr NERVE BLCK INF [facility]


   Last Admin: 11/05/18 05:54 Dose:  250 mls


Sodium Chloride (Normal Saline 0.9%)  1,000 mls @ 50 mls/hr IV .Q20H [facility]


   Last Admin: 11/05/18 05:28 Dose:  Not Given


Ceftriaxone Sodium 2 gm/ (Sodium Chloride)  100 mls @ 200 mls/hr IVPB Q24HR [facility]


   Last Admin: 11/04/18 13:48 Dose:  100 mls


Vancomycin HCl 1 gm/ Device  200 mls @ 200 mls/hr IVPB Q12HR [facility]


   Last Admin: 11/05/18 08:47 Dose:  200 mls


Iron/Minerals/Multivitamins (Theragran M)  1 tab PO DAILY [facility]


   Last Admin: 11/05/18 08:44 Dose:  1 tab


Ketorolac Tromethamine (Toradol)  15 mg IVP Q6H PRN


   PRN Reason: Pain


   Stop: 11/08/18 09:24


Levothyroxine Sodium (Synthroid)  100 mcg PO 0600 [facility]


   Last Admin: 11/05/18 07:21 Dose:  100 mcg


Magnesium Hydroxide (Milk Of Magnesium)  30 ml PO DAILYPRN PRN


   PRN Reason: Constipation


Nitroglycerin (Nitrostat)  0.4 mg SL Q5MIN PRN


   PRN Reason: Chest Pain


Nystatin (Mycostatin Powder)  1 gm TOP BID [facility]


Olmesartan (Benicar)  40 mg PO DAILY [facility]


   Last Admin: 11/05/18 08:49 Dose:  40 mg


Ondansetron HCl (Zofran)  4 mg IVP Q6H PRN


   PRN Reason: Nausea/Vomiting


Ondansetron HCl (Zofran)  4 mg IM Q6H PRN


   PRN Reason: Nausea/Vomiting


Pantoprazole Sodium (Protonix)  40 mg PO 2100 [facility]


   Last Admin: 11/04/18 21:29 Dose:  40 mg


Phenazopyridine HCl (Azo Standard)  97.5 mg PO Q8H PRN


   PRN Reason: burning with urination


Polyethylene Glycol (Miralax)  17 gm PO DAILY [facility]


   Last Admin: 11/05/18 08:46 Dose:  17 gm


Potassium Chloride (K-Dur)  40 meq PO NOW [facility]


   Stop: 11/05/18 15:00


Prednisone (Prednisone)  10 mg PO DAILY [facility]


   Last Admin: 11/05/18 08:45 Dose:  10 mg


Promethazine HCl (Phenergan)  12.5 mg IM Q4H PRN


   PRN Reason: Nausea


Senna/Docusate Sodium (Senokot S)  2 tab PO BID [facility]


   Last Admin: 11/05/18 08:44 Dose:  2 tab


Sodium Biphosphate/Sodium Phosphate (Fleet Enema)  133 ml KS DAILYPRN PRN


   PRN Reason: Constipation


Sodium Chloride (Flush - Normal Saline)  10 ml IVF PRN PRN


   PRN Reason: Saline Flush


   Last Admin: 11/04/18 09:32 Dose:  10 ml


Sodium Chloride (Ocean Nasal Spray 0.65%)  0 ml EA NARE QIDPRN PRN


   PRN Reason: Nasal Congestion


Throat Lozenges (Cepastat Lozenges)  1 natalie PO Q2H PRN


   PRN Reason: Sore Throat


Tramadol HCl (Ultram)  50 mg PO Q6H PRN


   PRN Reason: Mild Pain (1-3)


Tramadol HCl (Ultram)  100 mg PO Q6H PRN


   PRN Reason: Moderate Pain 4-6


Zolpidem Tartrate (Ambien)  5 mg PO HSPRN PRN


   PRN Reason: Insomnia

## 2018-11-05 NOTE — PRG
DATE OF SERVICE:  11/05/2018

 

SUBJECTIVE:  No pain.  No respiratory symptoms.  No diarrhea.  Still has an 
indwelling Anderson catheter.

 

OBJECTIVE:

HEENT:  Ocular movements conjugate.

LUNGS:  Clear.

HEART:  S1, S2, regular rate.

ABDOMEN:  Soft.

 

LABORATORY DATA:  White cell count is 5.7, hemoglobin 8.9, platelets 244,000.  
Iron 18.  All the cultures thus far are negative.

 

Dr. Ramírez has requested extension of the incubation to 14 days total.

 

ASSESSMENT AND DISCUSSION:  Prior history of autoimmune encephalitis on low 
dose steroids, osteonecrosis of left tibia status post knee replacement and now 
inflammatory process with a positive alpha-defensin test and removal of both 
components.  Cultures are thus far negative and culture incubation will be 
extended to a total of 14 days.  Other methods to improve yield of cultures 
such as direct inoculation synovial fluid.  Sonication of the implants have 
been removed.  Those are not available.  The extension of incubation increases 
about 10% of the yield typically with propionibacterium and peptostreptococcus.
  In her case, we are going to treated her with Rocephin and vancomycin 
anyways.  PICC line will be placed and the date of therapy will be 12/14/2018 
with weekly labs.

 

[facility]

## 2018-11-27 NOTE — SPC
LEFT UPPER EXTREMITY PICC LINE EVALUATION:

11/27/18

 

CLINICAL HISTORY: 

Dysfunctioning left PICC line.

 

FINDINGS:  

Informed consent was obtained from the patient. The patient's indwelling left PICC line was prepped a
nd draped in the standard sterile fashion. Dressing was removed and PICC line was adjusted. Contrast 
injection was performed with a small volume (3 cm) of radiopaque contrast. This did reveal patency an
d appropriate flow within the PICC line catheter and subsequent injection into the cavoatrial region.
 Catheter was flushed and secured to the left upper extremity. Imaging was stored for documentation. 


 

RADIATION EXPOSURE DATA:

190 mGy*cm2. 0 minutes intermittent fluoroscopy.

 

IMPRESSION:  

Technically successful PICC line evaluation of left upper extremity with contrast injection proving p
atency and appropriate flow. 

 

POS: OTF

## 2018-12-03 NOTE — SPC
FLUOROSCOPIC ASSESSMENT OF A LEFT UPPER EXTREMITY PICC:

 

DATE: 12/3/2018.

 

HISTORY: 

Evaluate for PICC function.

 

FINDINGS: 

Under sterile conditions, both ports of the patient's left PICC were flushed and aspirated normally. 
 Then, contrast media was injected into both ports and imaging over the port entry site as well as th
e distal aspect of the port obtained.  No abnormalities are seen at the entry site or at the level of
 th distal tip of the catheter while injecting contrast media.  The tip of the catheter overlies th c
avoatrial junction and is unchanged.

 

IMPRESSION: 

No abnormality noted at access site or distal tip of left upper extremity PICC.

 

If there is any concern for venous thrombus, ultrasound could be performed.

 

POS: OTF

## 2019-03-21 NOTE — CT
CT ABDOMEN AND PELVIS:

 

03/21/2019

 

HISTORY:

Projectile vomiting and nausea.

 

COMPARISON:

04/02/2016

 

TECHNIQUE:

Axial CT imaging at 5 mm intervals, from the lung bases through the pubic symphysis, with oral contra
st media.  Coronal and sagittal reformatted imaging obtained.  No IV contrast provided.

 

FINDINGS:

The lack of IV contrast limits assessment of the vasculature and the viscera and for lymphadenopathy.


 

The imaged lung bases appear grossly unremarkable.  Cholecystectomy clips are present.  Coronary negro
rial calcification is noted.

 

No free intraperitoneal air or fluid.  The uterus appears surgically absent.

 

There is a 2.5 cm cyst in the superior aspect of the liver.  The spleen, pancreas, adrenal glands, an
d kidneys appear grossly unremarkable.  There is no nephrolithiasis or evidence of obstructive uropat
hy on either side.

 

The appendix is visualized and is within normal limits.  No focal area of bowel inflammatory change n
oted.  No evidence for bowel obstruction.  There are scattered atherosclerotic calcifications of the 
abdominal aorta and its branches.

 

There is no lymphadenopathy appreciated within the abdomen or pelvis.

 

Review of the osseous structures demonstrates multilevel lower lumbar spine facet hypertrophy.  There
 is anterolisthesis of L4 on L5, measuring 1 cm.  Disk space narrowing and vacuum disk formation is n
oted at L4-L5 and L5-S1, with bilateral L4-L5 and L5-S1 neural foraminal stenosis.

 

IMPRESSION:

No acute findings are noted.  Numerous incidental findings, as detailed above.

 

POS: OTF

## 2019-06-06 NOTE — RAD
EMAM:

CHEST ONE VIEW:

6/6/19

 

HISTORY: 

Autoimmune disease, vomiting, fever. 

 

COMPARISON: 

9/25/18.

 

FINDINGS: 

Heart size is within normal limits. Mild right hemidiaphragm elevation, stable. No confluent pneumoni
a or overt edema or pleural effusion. 

 

IMPRESSION: 

Stable appearing chest. Atherosclerosis of the aorta. No pneumonia or other acute process. 

 

POS: OFF

## 2019-06-07 NOTE — HP
CHIEF COMPLAINT:  Fever, generalized weakness.



HISTORY OF PRESENT ILLNESS:  The patient is a very pleasant 80-year-old female, with

a history of rheumatoid arthritis, autoimmune encephalitis, hypothyroidism, and

neurogenic bladder, who presents to the hospital with complaints of generalized

weakness and fever x1 day.  Furthermore, the patient states that for about a week,

she has been having significant generalized weakness to the point that she has been

sleeping very much and has been very tired.  She stated that last night she was

nauseated and vomited x1.  Also, she had a temperature of a 101.5 last night.  The

patient denies any diarrhea, any abdominal pain, or any chest pain or shortness of

breath.  The patient states that she has been eating and drinking well; however, she

has just been feeling very tired and weak for the past week.  She normally uses a

walker to ambulate; however, she feels much more weak as compared to her baseline.

The patient normally self caths herself.  She denies any dysuria or any pain to her

lower back or her suprapubic area. 



PAST MEDICAL HISTORY:  She has a history of;

1. Rheumatoid arthritis.

2. Autoimmune encephalitis.

3. Osteonecrosis.

4. Hypothyroidism.

5. Neurogenic bladder.

6. GERD.



PAST SURGICAL HISTORY:  Hysterectomy and cholecystectomy.  She has had a vein

ligation.  She has had a hardware removal from her left knee. 



FAMILY HISTORY:  Unknown history of father, unknown history of mother.



SOCIAL HISTORY:  She is nonsmoker.  No alcohol use.  No drug use.  She is a full

code.  Lives alone. 



ALLERGIES:  SHE IS ALLERGIC TO SULFA AND LEVAQUIN.  SHE GETS A RASH.



MEDICATIONS:  As of the following.  She is on;

1. Penicillin V 250 one p.o. daily.

2. Synthroid 150 daily.

3. Leflunomide 20 mg daily.

4. Lexapro 20 mg daily.

5. Losartan 100 mg daily.

6. Lasix 20 mg daily.

7. Prednisone 9 mg daily.

8. Amlodipine 5 mg daily.

9. Pantoprazole 40 mg daily.

10. Tramadol as needed.



REVIEW OF SYSTEMS:  All negative except for the ones mentioned above in the HPI.



LABORATORY RESULTS:  As of the following; WBCs of 7.3, hemoglobin of 12.3,

hematocrit of 36.9, platelets of 340.  Chemistry; sodium of 136, potassium of 3.7,

BUN of 32, creatinine of 1.98, lactic acid of 3.4, glucose of 176.  Urine; she had

large leukocyte esterase with wbc's and squamous epithelial cells 4 to 6. 



IMAGING STUDIES:  She did have a chest x-ray, which according to my interpretation

did not seem to have any acute abnormalities. 



PHYSICAL EXAMINATION:

VITAL SIGNS:  As of the following; her temperature in the ER was 98.7, 133/81, 89

heart rate, 96% on room air, 18 respirations. 

GENERAL:  She is awake, alert, and oriented x3.  She does not appear in distress. 

HEENT:  Normocephalic, atraumatic.  No lymphadenopathy noted.  Pupils are equal and

reactive to light.  Mucous membranes are moist. 

CV:  S1, S2 present.  No murmurs, rubs, or gallops. 

ABDOMEN:  Soft and nontender.  Bowel sounds are present x2. 

LUNGS:  Clear to auscultation.  No rhonchi or wheezes noted. 

EXTREMITIES:  No edema.  Pedal pulses are present x2. 

NEUROLOGICAL:  No focal deficits noted.  Oriented x3. 

SKIN:  No cuts, lesions, or bruises noted.



ASSESSMENT AND PLAN:  The patient is a very pleasant 80-year-old female who presents

to the hospital with complaints of generalized weakness and fever. 

1. Sepsis, most likely urinary source.  I did review her previous cultures.  Most

likely, she has Escherichia coli; however, given her immunocompromised state, since

she does take Orencia monthly for her rheumatoid and she is on prednisone which was

recently decreased from 10 mg to 9 mg on the 21st of May.  I will start her on some

Zosyn and Infectious Disease has been consulted.  Also, I will start her on some

gentle hydration and we will continue to monitor and urine culture has been sent. 

2. History of hypothyroidism.  We will continue her Synthroid.

3. History of hypertension.  We will continue her losartan and amlodipine.

4. History of rheumatoid arthritis and we will continue her prednisone.  At this

point, she does not require any stress-dose steroids.  If she does become

hypotensive, I will give her stress-dose steroids.  I will hold the penicillin 250

that is a prophylactic medication for her left knee. 

5. Deep venous thrombosis prophylaxis.  We will put the patient on SCDs and Lovenox

subcu. 







Job ID:  [id number]

## 2019-06-07 NOTE — CON
DATE OF CONSULTATION:  06/07/2019



REASON FOR CONSULTATION:  Sepsis.



HISTORY OF PRESENT ILLNESS:  An 80-year-old well known to me from multiple prior

visits both in the hospital as well as the clinic, who has history of autoimmune

encephalitis, which responded to corticosteroids, and osteonecrosis of left knee

with TKR, which unfortunately was affected by inflammatory changes postoperatively.

Implants were removed and the patient I think had the tibial component removed, but

she still has I think replacement there.  It was a one stage procedure and she

received protracted antimicrobial therapy and is on suppressive treatment at this

time.  She also has a neurogenic bladder and does in and out caths every 4 hours

approximately.  She does not measure the postvoid for the past few weeks and has

developed general malaise and deterioration in her functional status.  She has been

recently diagnosed with rheumatoid arthritis by Dr. Hodge and started on Orencia

and now developed progression of her weakness, developed fever up to 103.  Did not

have any headaches.  No visual symptoms.  No sore throat, odynophagia, or dysphagia.

 No more than her usual back pain and neck pain.  No cough, sputum production, or

dyspnea.  No chest pain.  No abdominal pain.  No dysuria.  No joint symptoms outside

the usual.  No neurological symptoms except for some confusional state associated

with weakness.  She has been started on broad-spectrum coverage and cultures are

pending.  Currently, she is feeling about the same, just had a fever again. 



REVIEW OF SYSTEMS:  A 10-point review of system as above.



MEDICAL HISTORY:  

1. Autoimmune encephalitis, on corticosteroids.

2. Rheumatoid arthritis, on Orencia.

3. Osteonecrosis of left knee status post TKR with postoperative infection and

revision. 

4. Neurogenic bladder with in-and-out caths.

5. GERD.



SURGICAL HISTORY:  

1. Hysterectomy.

2. Cholecystectomy.

3. TKR on left side with revision.



FAMILY HISTORY:  Noncontributory.



SOCIAL HISTORY:  Lives by herself, independent.  Never smoker.  No alcoholic

beverage use. 



ALLERGIES:  SULFA AND LEVAQUIN.



CURRENT MEDICATIONS:  

1. Norvasc.

2. Tylenol.

3. Lovenox.

4. Lexapro.

5. Synthroid.

6. Had been on Zosyn, now is on meropenem.

7. Zofran.

8. Protonix.

9. Prednisone 4 mg daily.



PHYSICAL EXAMINATION:

VITAL SIGNS:  T-max 101.2, /70, pulse 81, respirations 20, O2 saturation 96. 

SKIN:  The left knee surgical site with no inflammatory changes.  She has a

peripheral IV access and a Anderson catheter was just inserted. 

LYMPHATICS:  No lymphadenopathy. 

HEENT:  Ocular movements conjugate.  A little bit of periorbital edema.  Oral

cavity, normal. 

NECK:  Supple. 

LUNGS:  Clear to auscultation and percussion. 

HEART:  S1 and S2.  Regular rate.  No S3 or S4. 

ABDOMEN:  Soft, not distended or tender.  No ascites.  No bladder distention. 

EXTREMITIES:  No other joint inflammatory process noted.  She is oriented, follows

commands, appears in no distress.  Speech is normal.  Recall is normal. 



LABORATORY DATA:  White cell count 7.3, hemoglobin 12, MCV 79, platelets 340, 90%

neutrophils.  Hemoglobin now is 9.6, MCV 79, and WBC 3.8.  Sodium 138, creatinine is

at 1.5, which is better than admission when it was 1.9.  GFR is up from 24 to 33.

Liver profile normal.  Albumin 3.9.  Urinalysis with greater than 50 wbc's.  Urine

culture and blood culture are pending. 



IMAGING STUDIES:  We have the previous abdomen and pelvis from March with no acute

findings.  The chest x-ray at this time was normal. 



ASSESSMENT:  

1. History of autoimmune encephalitis.

2. Rheumatoid arthritis, on Orencia.  She is on low-dose prednisone.

3. Neurogenic bladder, on in-and-out caths, now with abnormal urinalysis and fever.



DISCUSSION:  Differential diagnosis includes the more likely scenario of an invasive

UTI.  She did have an ESBL organism in the past, so we will go ahead and switch her

to meropenem until we have the final results of cultures.  The other possibility

would be an opportunistic process in view of the corticosteroids and the Orencia

administration.  We await for the cultures and response to meropenem.  We may have

to submit assays for opportunistic pathogens, but we will wait for the time being. 







Job ID:  [id number]

## 2019-06-08 NOTE — PDOC.PN
- Subjective


Encounter Start Date: 06/08/19


Encounter Start Time: 10:15


Subjective: pt up in bed appears well today, had a bad night 





- Objective


Resuscitation Status - Order Detail:





06/06/19 17:46


Resuscitation Status Routine 


   Resuscitation Status: FULL: Full Resuscitation








Vital Signs & Weight: 


 Vital Signs (12 hours)











  Temp Pulse Pulse Resp BP Pulse Ox Pulse Ox


 


 06/08/19 11:36    92     95


 


 06/08/19 08:00       99 


 


 06/08/19 06:56  98.6 F  100   17  138/71  99 


 


 06/08/19 03:42       98 


 


 06/08/19 03:41       98 








 Weight











Weight                         205 lb 4 oz














I&O: 


 











 06/07/19 06/08/19 06/09/19





 06:59 06:59 06:59


 


Intake Total 1762 2968 


 


Output Total 900 2350 


 


Balance 862 618 











Result Diagrams: 


 06/07/19 04:28





 06/07/19 04:28





Phys Exam





- Physical Examination


Neck: no nodes, no JVD, supple, full ROM


Respiratory: no wheezing, no rales, no rhonchi, wheezing present, clear to 

auscultation bilateral


Cardiovascular: RRR, no significant murmur, no rub, gallop, irregular


Gastrointestinal: soft, non-tender, no distention, positive bowel sounds





Dx/Plan


(1) Sepsis


Code(s): A41.9 - SEPSIS, UNSPECIFIED ORGANISM   Status: Acute   





(2) UTI (urinary tract infection)


Status: Acute   





(3) Fever


Code(s): R50.9 - FEVER, UNSPECIFIED   Status: Acute   





(4) HTN (hypertension)


Code(s): I10 - ESSENTIAL (PRIMARY) HYPERTENSION   Status: Chronic   





(5) Hypothyroid


Code(s): E03.9 - HYPOTHYROIDISM, UNSPECIFIED   Status: Chronic   





- Plan


no fevers, cx indicate serratia cescens


-: will given one more dose of solu cortef


-: will start her back on her cpap


-: pt to evaluate pt





* .








Review of Systems





- Review of Systems


Respiratory: negative: Cough, Dry, Shortness of Breath, Hemoptysis, SOB with 

Excertion, Pleuritic Pain, Sputum, Wheezing


Cardiovascular: negative: chest pain, palpitations, orthopnea, paroxysmal 

nocturnal dyspnea, edema, light headedness, other


Gastrointestinal: negative: Nausea, Vomiting, Abdominal Pain, Diarrhea, 

Constipation, Melena, Hematochezia, Other





- Medications/Allergies


Allergies/Adverse Reactions: 


 Allergies











Allergy/AdvReac Type Severity Reaction Status Date / Time


 


levofloxacin [From Levaquin] Allergy Intermediate Rash Verified 06/06/19 21:28


 


Sulfa (Sulfonamide Allergy Intermediate Rash Verified 06/06/19 21:28





Antibiotics)     











Medications: 


 Current Medications





Acetaminophen (Tylenol)  650 mg PO Q4H PRN


   PRN Reason: Headache/Fever/Mild Pain (1-3)


   Last Admin: 06/07/19 10:28 Dose:  650 mg


Acetaminophen (Tylenol)  650 mg DC Q4H PRN


   PRN Reason: Headache/Fever or Pain


Acidophilus (Floranex)  1 tab PO DAILY [facility]


Albuterol/Ipratropium (Duoneb)  3 ml NEB Q8H PRN


   PRN Reason: SOB/WHEEZE


   Last Admin: 06/08/19 03:41 Dose:  3 ml


Amlodipine Besylate (Norvasc)  5 mg PO QPM [facility]


   Last Admin: 06/07/19 20:50 Dose:  5 mg


Calcium Carbonate (Tums)  1,000 mg PO Q4H PRN


   PRN Reason: Heartburn  or Indigestion


   Last Admin: 06/07/19 22:29 Dose:  1,000 mg


Enoxaparin Sodium (Lovenox)  40 mg SC 0900 [facility]


   Last Admin: 06/08/19 09:32 Dose:  40 mg


Escitalopram Oxalate (Lexapro)  20 mg PO DAILY [facility]


   Last Admin: 06/08/19 09:32 Dose:  20 mg


Ferrous Sulfate (Feosol)  325 mg PO DAILY [facility]


Furosemide (Lasix)  20 mg PO DAILY [facility]


Guaifenesin (Robitussin Sf)  200 mg PO Q8H PRN


   PRN Reason: Cough


   Last Admin: 06/08/19 03:30 Dose:  200 mg


Hydrocortisone Sodium Succinate (Solu-Cortef)  50 mg IVP DAILY [facility]


   Stop: 06/09/19 09:01


Sodium Chloride (Normal Saline 0.9%)  1,000 mls @ 50 mls/hr IV .Q20H [facility]


   Last Admin: 06/08/19 00:28 Dose:  1,000 mls


Meropenem 1 gm/ Device  50 mls @ 100 mls/hr IVPB Q12HR [facility]


   Last Admin: 06/08/19 09:25 Dose:  50 mls


Leflunomide (Arava)  20 mg PO DAILY [facility]


Levothyroxine Sodium (Synthroid)  100 mcg PO 0600 [facility]


   Last Admin: 06/08/19 05:31 Dose:  100 mcg


Ondansetron HCl (Zofran)  4 mg IVP Q6H PRN


   PRN Reason: Nausea/Vomiting


   Last Admin: 06/07/19 00:34 Dose:  4 mg


Pantoprazole Sodium (Protonix)  40 mg PO DAILY [facility]


Biofreeze Cream  0 each TOP PRN PRN


   PRN Reason: Muscle aches/pain


Polyethylene Glycol (Miralax)  17 gm PO DAILY [facility]


   Last Admin: 06/08/19 09:32 Dose:  17 gm


Prednisone (Prednisone)  4 mg PO QAM-WM [facility]


   Last Admin: 06/08/19 09:31 Dose:  4 mg


Prednisone (Prednisone)  5 mg PO QAM-WM [facility]


   Last Admin: 06/08/19 09:32 Dose:  5 mg


Senna/Docusate Sodium (Senokot S)  2 tab PO BID PRN


   PRN Reason: Constipation


Sodium Chloride (Flush - Normal Saline)  10 ml IVF Q12HR [facility]


   Last Admin: 06/08/19 09:33 Dose:  Not Given


Sodium Chloride (Flush - Normal Saline)  10 ml IVF PRN PRN


   PRN Reason: Saline Flush

## 2019-06-08 NOTE — PRG
DATE OF SERVICE:  06/08/2019



SUBJECTIVE:  Feels a little bit better today.  No headaches.  No respiratory

symptoms.  No abdominal pain.  No knee pain.  Still has a Anderson catheter in place.

She is interested in getting it removed as soon as possible. 



OBJECTIVE:  VITAL SIGNS:  T-max 100.2, she is now 98.6.  Other vital signs not

remarkable. 

GENERAL:  Awake, alert, oriented, very alert, very sharp. 

HEENT:  Ocular movements conjugate.  Oral cavity moist. 

LUNGS:  Clear to auscultation and percussion. 

ABDOMEN:  Soft and nondistended. 

:  Anderson catheter in place with clear urine.



LABORATORY DATA:  White cell count 3.8, hemoglobin 9.6, platelets 249.  Normal

differential.  Creatinine down to 1.5.  Repeat urinalysis with greater than 50 WBCs.

 Microbiology with Serratia marcescens, resistant to cefoxitin and nitrofurantoin,

susceptible to quinolones. 



ASSESSMENT AND DISCUSSION:  

1. History of autoimmune encephalitis, on chronic prednisone.

2. Rheumatoid arthritis, recently diagnosed, on Orencia.

3. Neurogenic bladder, in and out caths, now with evidence of invasive urinary tract

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 

Maybe tomorrow, they have to remove Anderson and switch her back to in and out caths.

She may prefer to start doing them again when she gets discharged home.  Continue

Merrem and eventual switch to oral either Cipro or Vantin and treat for probably 2

weeks. 







Job ID:  [id number]

## 2019-06-08 NOTE — PDOC.PN
- Subjective


Encounter Start Date: 06/07/19


Encounter Start Time: 11:15


Subjective: pt up in bed appears ill





- Objective


Resuscitation Status - Order Detail:





06/06/19 17:46


Resuscitation Status Routine 


   Resuscitation Status: FULL: Full Resuscitation








Vital Signs & Weight: 


 Vital Signs (12 hours)











  Temp Pulse Pulse Resp BP Pulse Ox Pulse Ox


 


 06/08/19 11:36    92     95


 


 06/08/19 08:00       99 


 


 06/08/19 06:56  98.6 F  100   17  138/71  99 


 


 06/08/19 03:42       98 


 


 06/08/19 03:41       98 








 Weight











Weight                         205 lb 4 oz














I&O: 


 











 06/07/19 06/08/19 06/09/19





 06:59 06:59 06:59


 


Intake Total 1762 2968 


 


Output Total 900 2350 


 


Balance 862 618 











Result Diagrams: 


 06/07/19 04:28





 06/07/19 04:28





Phys Exam





- Physical Examination


Neck: no nodes, no JVD, supple, full ROM


Respiratory: no wheezing, no rales, no rhonchi, wheezing present, clear to 

auscultation bilateral


Cardiovascular: RRR, no significant murmur, no rub, gallop, irregular


Gastrointestinal: soft, non-tender, no distention, positive bowel sounds





Dx/Plan


(1) Sepsis


Code(s): A41.9 - SEPSIS, UNSPECIFIED ORGANISM   Status: Acute   





(2) UTI (urinary tract infection)


Status: Acute   





(3) Fever


Code(s): R50.9 - FEVER, UNSPECIFIED   Status: Acute   





(4) HTN (hypertension)


Code(s): I10 - ESSENTIAL (PRIMARY) HYPERTENSION   Status: Chronic   





(5) Hypothyroid


Code(s): E03.9 - HYPOTHYROIDISM, UNSPECIFIED   Status: Chronic   





- Plan


will consult ID per family, will give one dose of solu cortef due to possib


-: adreneal insufficiency. will continue iv hydration and hold 


-: her leflunomide





* .








Review of Systems





- Review of Systems


Constitutional: fever, chills


Cardiovascular: negative: chest pain, palpitations, orthopnea, paroxysmal 

nocturnal dyspnea, edema, light headedness, other


Gastrointestinal: negative: Nausea, Vomiting, Abdominal Pain, Diarrhea, 

Constipation, Melena, Hematochezia, Other





- Medications/Allergies


Allergies/Adverse Reactions: 


 Allergies











Allergy/AdvReac Type Severity Reaction Status Date / Time


 


levofloxacin [From Levaquin] Allergy Intermediate Rash Verified 06/06/19 21:28


 


Sulfa (Sulfonamide Allergy Intermediate Rash Verified 06/06/19 21:28





Antibiotics)     











Medications: 


 Current Medications





Acetaminophen (Tylenol)  650 mg PO Q4H PRN


   PRN Reason: Headache/Fever/Mild Pain (1-3)


   Last Admin: 06/07/19 10:28 Dose:  650 mg


Acetaminophen (Tylenol)  650 mg NV Q4H PRN


   PRN Reason: Headache/Fever or Pain


Acidophilus (Floranex)  1 tab PO DAILY [facility]


Albuterol/Ipratropium (Duoneb)  3 ml NEB Q8H PRN


   PRN Reason: SOB/WHEEZE


   Last Admin: 06/08/19 03:41 Dose:  3 ml


Amlodipine Besylate (Norvasc)  5 mg PO QPM [facility]


   Last Admin: 06/07/19 20:50 Dose:  5 mg


Calcium Carbonate (Tums)  1,000 mg PO Q4H PRN


   PRN Reason: Heartburn  or Indigestion


   Last Admin: 06/07/19 22:29 Dose:  1,000 mg


Enoxaparin Sodium (Lovenox)  40 mg SC 0900 [facility]


   Last Admin: 06/08/19 09:32 Dose:  40 mg


Escitalopram Oxalate (Lexapro)  20 mg PO DAILY [facility]


   Last Admin: 06/08/19 09:32 Dose:  20 mg


Ferrous Sulfate (Feosol)  325 mg PO DAILY [facility]


Furosemide (Lasix)  20 mg PO DAILY [facility]


Guaifenesin (Robitussin Sf)  200 mg PO Q8H PRN


   PRN Reason: Cough


   Last Admin: 06/08/19 03:30 Dose:  200 mg


Hydrocortisone Sodium Succinate (Solu-Cortef)  50 mg IVP DAILY [facility]


   Stop: 06/09/19 09:01


Sodium Chloride (Normal Saline 0.9%)  1,000 mls @ 50 mls/hr IV .Q20H [facility]


   Last Admin: 06/08/19 00:28 Dose:  1,000 mls


Meropenem 1 gm/ Device  50 mls @ 100 mls/hr IVPB Q12HR [facility]


   Last Admin: 06/08/19 09:25 Dose:  50 mls


Leflunomide (Arava)  20 mg PO DAILY [facility]


Levothyroxine Sodium (Synthroid)  100 mcg PO 0600 [facility]


   Last Admin: 06/08/19 05:31 Dose:  100 mcg


Ondansetron HCl (Zofran)  4 mg IVP Q6H PRN


   PRN Reason: Nausea/Vomiting


   Last Admin: 06/07/19 00:34 Dose:  4 mg


Pantoprazole Sodium (Protonix)  40 mg PO DAILY [facility]


Biofreeze Cream  0 each TOP PRN PRN


   PRN Reason: Muscle aches/pain


Polyethylene Glycol (Miralax)  17 gm PO DAILY [facility]


   Last Admin: 06/08/19 09:32 Dose:  17 gm


Prednisone (Prednisone)  4 mg PO QAM-WM [facility]


   Last Admin: 06/08/19 09:31 Dose:  4 mg


Prednisone (Prednisone)  5 mg PO QAM-WM [facility]


   Last Admin: 06/08/19 09:32 Dose:  5 mg


Senna/Docusate Sodium (Senokot S)  2 tab PO BID PRN


   PRN Reason: Constipation


Sodium Chloride (Flush - Normal Saline)  10 ml IVF Q12HR [facility]


   Last Admin: 06/08/19 09:33 Dose:  Not Given


Sodium Chloride (Flush - Normal Saline)  10 ml IVF PRN PRN


   PRN Reason: Saline Flush

## 2019-06-09 NOTE — PRG
DATE OF SERVICE:  06/09/2019



SUBJECTIVE:  The patient said she is feeling significantly better today.  She ate a

good breakfast.  She has no specific concerns or complaints today. 



PHYSICAL EXAMINATION:

VITAL SIGNS:  T-max is 98.1, pulse 88, respirations 19; O2 saturation 92% on 2 L up

to 96% on room air, presently on room air; /72. 

GENERAL APPEARANCE:  Age-appropriate female, slightly obese.  No distress.  Awake,

alert, oriented, pleasant, cooperative, sitting up in a chair. 

HEENT:  PERRL.  No OP lesions. 

NECK:  Supple and symmetric. 

HEART:  Regular rate and rhythm without murmurs, gallops, or rubs. 

LUNGS:  Clear bilaterally. 

ABDOMEN:  Soft, nontender, and nondistended.  She has no CVA tenderness. 

EXTREMITIES:  No significant edema.



IMPRESSION AND PLAN:  

1. Urinary tract infection with Serratia, followed by Dr. Jackson on meropenem.  Per

his plan, we will continue that for now. 

2. Sepsis, resolved.

3. Rheumatoid arthritis with immunosuppression, necessitating more aggressive

approach to the patient's underlying infection, continuing with her

immunomodulators. 

4. Hypertension, stable.

5. Hypothyroidism, stable.

6. Neurogenic bladder.  Discussed the patient's Anderson catheter.  She does straight

catheterizations at home and will prefer to keep the chronic indwelling Anderson in

until she is ready for discharge. 

7. Obstructive sleep apnea.  The patient has her CPAP machine; however, has been

having some problems with getting it to seal appropriately.  We will ask respiratory

therapy to assess that to see if they can help her with that. 







Job ID:  [id number]

## 2019-06-10 NOTE — PRG
DATE OF SERVICE:  06/10/2019



SUBJECTIVE:  Having liquid stool 3 times a day.  No abdominal cramps.  No

respiratory symptoms.  No back pain.  Still with a Anderson catheter in place. 



OBJECTIVE:  VITAL SIGNS:  T-max 98.6 and /66. 

GENERAL:  Appears in no distress.  Awake and oriented. 

LUNGS:  Clear. 

CARDIAC:  S1 and S2.  Regular rate. 

ABDOMEN:  Soft, not distended.  Anderson catheter in place with clear urine.



LABORATORY DATA:  Potassium 3.2 and creatinine 0.96.  White cell count 3.3,

hemoglobin 9.9, and platelets 273. 



CURRENT MEDICATIONS:  Currently on meropenem.



ASSESSMENT:  Autoimmune encephalitis history; rheumatoid arthritis, on Orencia and

low-dose prednisone; neurogenic bladder with in and out caths; fever with abnormal

urinalysis with presumed Serratia marcescens and invasive urinary tract infection,

currently on meropenem. 



PLAN:  Plan is for discharge tomorrow on oral ciprofloxacin or cefpodoxime for

another 7 days.  In view of diarrhea, C difficile testing will be performed. 







Job ID:  [id number]

## 2019-06-10 NOTE — PDOC.PN
- Subjective


Encounter Start Date: 06/10/19


Encounter Start Time: 13:00





Still does not feel like she is at he baseline.  She feels fatigued.  Has 

developed diarrhea.  Couple times per day.  





- Objective


Resuscitation Status - Order Detail:





06/06/19 17:46


Resuscitation Status Routine 


   Resuscitation Status: FULL: Full Resuscitation








Vital Signs & Weight: 


 Vital Signs (12 hours)











  Temp Pulse Resp BP Pulse Ox


 


 06/10/19 07:43  98.1 F  76  20  132/66  97








 Weight











Weight                         205 lb 4 oz














I&O: 


 











 06/09/19 06/10/19 06/11/19





 06:59 06:59 06:59


 


Intake Total 2460 2600 


 


Output Total 1875 3250 


 


Balance 585 -650 











Result Diagrams: 


 06/10/19 04:36





 06/10/19 04:36





Phys Exam





- Physical Examination


Constitutional: NAD


Obese.


Respiratory: no wheezing, no rales, no rhonchi, clear to auscultation bilateral


Cardiovascular: RRR, no significant murmur, no rub


Gastrointestinal: soft, non-tender, no distention, positive bowel sounds


Musculoskeletal: no edema


Neurological: non-focal


Lymphatic: no nodes


Psychiatric: normal affect, A&O x 3





Dx/Plan


(1) Serratia infection


Code(s): A49.8 - OTHER BACTERIAL INFECTIONS OF UNSPECIFIED SITE   Status: Acute

   





(2) Sepsis


Code(s): A41.9 - SEPSIS, UNSPECIFIED ORGANISM   Status: Acute   





(3) UTI (urinary tract infection)


Status: Acute   





(4) H/O autoimmune encephalitis


Status: Chronic   Comment: Continue Prednisone   





(5) HTN (hypertension)


Code(s): I10 - ESSENTIAL (PRIMARY) HYPERTENSION   Status: Chronic   





(6) Hypothyroid


Code(s): E03.9 - HYPOTHYROIDISM, UNSPECIFIED   Status: Chronic   





(7) Rheumatoid arthritis


Code(s): M06.9 - RHEUMATOID ARTHRITIS, UNSPECIFIED   Status: Acute   





(8) Diarrhea


Code(s): R19.7 - DIARRHEA, UNSPECIFIED   Status: Acute   





(9) HARISH (obstructive sleep apnea)


Code(s): G47.33 - OBSTRUCTIVE SLEEP APNEA (ADULT) (PEDIATRIC)   Status: Acute   





- Plan





* Immunocompromised by RA / Autoimmune disorder meds.


* Continue Merem.  Eventual switch to Cipro or Vantin. 


* ID following.


* Check stool for C diff.


* CPAP mask fixed by RT.

## 2019-06-12 NOTE — DIS
DATE OF ADMISSION:  06/06/2019



DATE OF DISCHARGE:  06/11/2019



DISCHARGE DIAGNOSES:  

1. Sepsis secondary to urinary tract infection related to self catheterization.

2. Urinary tract infection with serratia marcescens.

3. Neurogenic bladder.

4. History of autoimmune disease including rheumatoid arthritis.

5. Hypertension.

6. Hypothyroidism.

7. Diarrhea.

8. Obstructive sleep apnea.



HISTORY OF PRESENT ILLNESS:  The patient is an 80-year-old female, who presented via

the emergency department complaining of generalized fatigue and malaise.  She was

noted to be a bit tachycardic and initially did have some low-grade fever.  Workup

revealed evidence of urinary tract infection.  Given the patient's self cathing and

the immunosuppression related to her rheumatoid medications, the patient was

admitted to the hospital. 



HOSPITAL COURSE:  Because of the patient's history and evidence of prior ESBL

urinary tract infection, Infectious Disease was consulted.  Dr. Jackson saw the

patient in consultation and changed her antibiotics to meropenem to cover ESBL until

cultures were available.  Cultures did ultimately grow Serratia and antibiotics were

continued to the IV.  The patient was improving daily.  She did have some mild GI

symptoms including some diarrhea that were felt to be related to the infection

itself and those symptoms did improve and resolve.  She was able to eat adequately.

She also had some minor problems with her CPAP mask.  Respiratory Therapy was asked

to evaluate as they were able to reconnect her mask in the proper setting and

improve that situation as well.  Once the patient was in stable condition, she was

appropriate for discharge. 



PHYSICAL EXAMINATION:

VITAL SIGNS:  On the day of discharge, temperature was 97.8, pulse 90, respirations

16, O2 saturation 91% on room air, and /76. 

GENERAL:  She was awake and alert, pleasant, cooperative, slightly obese, no

distress. 

HEART:  Regular rate and rhythm. 

LUNGS:  Clear bilaterally. 

ABDOMEN:  Benign. 

EXTREMITIES:  Without edema.



DISPOSITION:  The patient is discharged to home.



DISCHARGE MEDICATIONS:  She will be on cefpodoxime 100 mg p.o. q.12 hours.  She will

continue with, 

1. Synthroid.

2. Lexapro.

3. Pantoprazole.

4. Furosemide.

5. Amlodipine.

6. Losartan.

7. Leflunomide.

8. Penicillin V.

9. Tramadol.

10. Prednisone.

11. Ferrous sulfate.

12. Probiotics.

13. Multivitamin.

14. Omega-3 fatty acids.

15. Vitamin D and the patient's other usual daily vitamins.



FOLLOWUP:  She will have follow up with Dr. Cadet in 7 days and Dr. Jackson as

instructed.  Her catheter has been discontinued.  She will return to self cathing. 



ACTIVITY:  As tolerated.



DIET:  She will remain on a regular diet.



TIME SPENT:  Total time spent in discharge planning and activities including

face-to-face time with the patient was 40 minutes. 







Job ID:  [id number]

## 2019-06-13 NOTE — PQF
[employer]      
                                      SARI LUCRETIA

[id number]                                                             [id number]

[id number]                             

                                   

CLINICAL DOCUMENTATION CLARIFICATION FORM:  POST DISCHARGE



Addendum to original discharge summary date:  __________________________________
____



Late entry note date:  _________________________________________________________
__











DATE: 06-                                           ATTN: Lucretia Abdi



Please exercise your independent, professional judgment in responding to the 
clarification form. 

Clinical indicators are provided on the bottom of this form for your review



Can you please specify if the patient self catheterization was the cause of UTI?



Please check appropriate box(s):



x[  ] Sepsis due to UTI is a complication of patient self catheterization

[  ] Sepsis due to UTI is not complication of patient self catheterization

[  ] Other diagnosis please specify ___________

[  ] Unable to determine







CLINICAL INDICATORS:

H&P 06/06 pg1 by Dr. Robert Presents to the hospital with complaints of 
generalized weakness and fever x 1 day

H&P 06/06 pg1 by Dr. Robert She stated that last night she was nauseated and 
vomited x 1. also she had temperature of 101.5 last night.

H&P 06/06 pg2 by Dr. Robert Sepsis most likely urinary source

Consult 06/07 pg1 by Dr. Jackson She also has a neurogenic bladder and does in 
and out caths every 4 hours approximately. She does not measure the postvoid 
for the past feew weeks and has developed general malaise and deterioration in 
her functional status

PN 06/07 pg1 by Dr. Robert Sepsis, UTI

PN 06/08 pg1 by Dr. Jackson Neurogenic bladder in and out caths, now with 
evidence of invasive urinary tract infection due to Serratia species

Laboratory : WBC=3.8 L (06/07), 3.3 (06/10)

Laboratory : Lactic acid= 3.4 H (06/06)





RISK FACTORS:

H&P 06/06- History of rheumatoid arthritis and we will continue prednisone

Consult 06/07-Neurogenic bladder with in and out caths



TREATMENT:

MAR 06/07- Zosyn 3.375 gm IV

MAR 06/07-Rocephin 1 gm IV



(This form is maintained as a part of the permanent medical record)

2015 [email or web address].  All Rights Reserved

Macy [email or web address]    [not provided]

                                                              



MTDD

## 2019-06-27 NOTE — RAD
PORTABLE CHEST 1 VIEW:

 

Date:  06/27/19 

Time:  1327 hours

 

HISTORY:  

Fever, nausea, vomiting, urosepsis. 

 

FINDINGS:

 

Comparison made with exam of 06/06/19. 

 

There is continued elevation of the right hemidiaphragm. The heart size is prominent but stable. The 
aorta is tortuous. The lungs are expanded without focal areas of consolidation, pneumothoraces, or pl
eural effusions. There is no evidence of phil pulmonary edema. 

 

IMPRESSION: 

No radiographic evidence of acute cardiopulmonary process. 

 

 

POS: OFF

## 2019-08-27 NOTE — RAD
LEFT SHOULDER 3 VIEWS:

 

Date:  08/27/19 

 

HISTORY:  

Left shoulder pain, secondary osteoarthritis. 

 

FINDINGS:

There are degenerative changes in the acromioclavicular and glenohumeral joints. No fracture, disloca
tion, or bony destruction is seen. 

 

IMPRESSION: 

Left shoulder osteoarthritis. 

 

 

POS: OFF

## 2019-09-05 NOTE — RAD
EXAM: Single view of the chest



HISTORY:   Fever



COMPARISON: 4/17/2016



FINDINGS: Single view of the chest shows a normal sized cardiomediastinal silhouette. There is no vincent
dence of consolidation, mass, or pleural effusion. The bones are unremarkable.



IMPRESSION: No evidence of acute cardiopulmonary disease



Reported By: Ben Mccloud 

Electronically Signed:  9/5/2019 1:03 PM

## 2019-09-05 NOTE — HP
CHIEF COMPLAINT:  Fever and fatigue.



HISTORY OF PRESENT ILLNESS:  This patient is an 80-year-old female with a history of

rheumatoid arthritis and autoimmune encephalitis.  The patient has had prior

opportunistic infections as well.  The patient presented to the emergency department

today with at least a 3- to 4-day history of feeling profoundly fatigued with

lethargy and fever.  She presented to her outpatient provider, who ordered some labs

and chest x-ray, which were generally unrevealing.  The patient subsequently

progressed and presented to the emergency department today.  In the emergency

department, the patient has had some nausea and some vomiting.  She also admits to

having had some pain in the base of her head yesterday, but no current headache.

She does have some sores that have erupted in the left temporal area as well as a

rash developing around the left chin area.  She also has some lesions on the inside

of her mouth. 



REVIEW OF SYSTEMS:  All other systems reviewed.  All pertinent positives and

negatives noted in the history of present illness. 



PAST MEDICAL HISTORY:  Notable for rheumatoid arthritis; autoimmune encephalitis;

osteonecrosis; hypothyroidism; neurogenic bladder; GERD; obstructive sleep apnea, on

CPAP;and hypertension. 



PAST SURGICAL HISTORY:  Hysterectomy, cholecystectomy, vein ligation, and hardware

removal of left knee. 



FAMILY HISTORY:  Father had diabetes and heart disease.  Mother had diabetes and

hypertension. 



SOCIAL HISTORY:  The patient is a nonsmoker, nondrinker, and nondrug user.  She is

full code.  She lives alone with help at home.  Her surrogate decision maker would

be her daughter, Dominique at [phone]. 



ALLERGIES:  

1. SULFA.

2. LEVAQUIN.



CURRENT MEDICATIONS:  

1. Orencia injections, which she had most recently on last Wednesday.

2. Prednisone 9 mg p.o. daily.

3. Lasix 20 mg daily.

4. Pantoprazole 40 mg daily.

5. Leflunomide 20 mg daily.

6. Lexapro 20 mg daily.

7. Losartan 100 mg daily.

8. Ultracet 37.5-325 one to two t.i.d. p.r.n. 

9. Amlodipine 5 mg daily.

10. Synthroid 100 mcg daily.

11. Veronica C 500 mg daily.

12. Centrum Silver 1 daily.

13. Azo-Standard p.r.n.

14. Iron 125 mg-25 mg-1 mg p.o. daily.

15. Vitamin D3 one p.o. daily.

16. MiraLAX p.r.n.



PHYSICAL EXAMINATION:

VITAL SIGNS:  /80, pulse 95, respirations 21, temperature is 99, O2

saturations 94% on room air. 

GENERAL APPEARANCE:  Age-appropriate female.  She is in no distress.  She is obese.

Awake, alert, oriented, pleasant, and cooperative. 

HEENT:  PERRL.  She has some crusted serous fluid over a lesion in the left temporal

area with a halo of erythema that is 2 to 3 cm in size.  She has additional small

erythematous papules developing around the chin and left lower lip, and she has some

aphthous-type lesions on the tip of the tongue on the left side as well.  She has no

other OP lesions. 

NECK:  Supple and symmetric. 

HEART:  Regular rate and rhythm without murmurs, gallops, or rubs. 

LUNGS:  Clear to auscultation bilaterally.  Good chest wall expansion and air

exchange. 

ABDOMEN:  Soft, nontender, and nondistended.  Positive bowel sounds.  No masses.  No

organomegaly. 

EXTREMITIES:  No cyanosis, clubbing, or edema. 

:  She has a Anderson catheter, indwelling. 

SKIN:  Reveals no other lesions other than those mentioned on the left side of the

face. 



LABORATORY DATA:  White count 7.5, hemoglobin 12.2, platelets 277, 68% neutrophils,

3% bands, 11% lymphocytes, 17% monocytes.  Sodium 134, potassium 3.8, chloride 95,

CO2 is 24, BUN 26, creatinine is 1.55, glucose 181, lactic acid 3.3, calcium 9.5,

AST 20, ALT 24, alkaline phosphatase 65.  Troponin 0.011.  Albumin 4.11.

Urinalysis, essentially negative.  Flu screen, negative.  Chest x-ray, negative. 



IMPRESSION AND PLAN:  

1. Herpes zoster in patient who is immune compromised, who is unfortunately showing

some systemic symptoms with the fever and the nausea and vomiting.  She does not

otherwise have evidence of disseminated zoster.  She has only lesions confined to

the left face and temporal area.  No other evidence of organ injury.  We will

continue with IV acyclovir and consult infectious Disease. 

2. Rheumatoid arthritis with autoimmune encephalitis.  Continue with the prednisone

and leflunomide. 

3. Hypothyroidism .  We will continue with the thyroid supplementation.

4. Hypertension.  Continue her amlodipine and losartan.

5. History of neurogenic bladder.  We will leave her Anderson indwelling for now.

6. Elevated lactic acid.  I assume this is more related to a viral sepsis type

syndrome.  We will recheck. 

7. Chronic kidney disease, stage 3b.  She appears to be consistent with her baseline

renal function. 







Job ID:  [id number]

## 2019-09-06 NOTE — PDOC.HOSPP
- Subjective


Encounter Date: 09/06/19


Encounter Time: 12:37


Subjective: 


79 y/o female with RA on immusuppressive therapy admitted with acute onset of 

fever associated with generalized weakness, excessive sleepiness, nausea, 

vomiting and left sided facial rash. No new problem. still having intermittent 

fever.





- Objective


Vital Signs & Weight: 


 Vital Signs (12 hours)











  Temp Pulse Resp BP Pulse Ox


 


 09/06/19 11:48  100.4 F H  87  16  139/77  95


 


 09/06/19 08:22   101 H   


 


 09/06/19 07:55  99.2 F  101 H  16  118/71  94 L


 


 09/06/19 05:18  99.2 F    


 


 09/06/19 04:09  101.1 F H  114 H  16  138/81  97








 Weight











Weight                         198 lb














I&O: 


 











 09/05/19 09/06/19 09/07/19





 06:59 06:59 06:59


 


Intake Total  980 


 


Output Total  850 


 


Balance  130 











Result Diagrams: 


 09/06/19 07:41





 09/06/19 07:41





Hospitalist ROS





- Medication


Medications: 


Active Medications











Generic Name Dose Route Start Last Admin





  Trade Name Freq  PRN Reason Stop Dose Admin


 


Acetaminophen  650 mg  09/05/19 15:36  09/06/19 04:18





  Tylenol  PO   650 mg





  Q4H PRN   Administration





  Headache/Fever/Mild Pain (1-3)   





     





     





     


 


Amlodipine Besylate  5 mg  09/06/19 09:00  09/06/19 08:22





  Norvasc  PO   Not Given





  DAILY VIKKI   





     





     





     





     


 


Enoxaparin Sodium  40 mg  09/06/19 09:00  09/06/19 08:21





  Lovenox  SC   40 mg





  0900 VIKKI   Administration





     





     





     





     


 


Escitalopram Oxalate  20 mg  09/06/19 09:00  09/06/19 08:21





  Lexapro  PO   20 mg





  DAILY VIKKI   Administration





     





     





     





     


 


Furosemide  20 mg  09/06/19 09:00  09/06/19 08:21





  Lasix  PO   20 mg





  DAILY VIKKI   Administration





     





     





     





     


 


Acyclovir Sodium 900 mg/  268 mls @ 227.119 mls/hr  09/05/19 22:00  09/06/19 05:

18





  Sodium Chloride  IVPB   268 mls





  Q8HR VIKKI   Administration





     





     





     





     


 


Leflunomide  20 mg  09/06/19 09:00  09/06/19 08:20





  Arava  PO   20 mg





  DAILY VIKKI   Administration





     





     





     





     


 


Levothyroxine Sodium  100 mcg  09/06/19 06:00  09/06/19 04:19





  Synthroid  PO   100 mcg





  0600 VIKKI   Administration





     





     





     





     


 


Losartan Potassium  100 mg  09/06/19 09:00  09/06/19 08:22





  Cozaar  PO   Not Given





  DAILY VIKKI   





     





     





     





     


 


Pantoprazole Sodium  40 mg  09/06/19 09:00  09/06/19 08:21





  Protonix  PO   40 mg





  DAILY VIKKI   Administration





     





     





     





     














- Exam


General Appearance: awake alert


General - other findings: obese


Eye: anicteric sclera


ENT: normocephalic atraumatic


Neck: supple, symmetric


Heart: RRR


Respiratory: no wheezes, no rales, no ronchi, normal chest expansion


Gastrointestinal: soft, non-tender, non-distended, normal bowel sounds


Extremities: no cyanosis, no edema


Skin - other findings: Left sided temporal and anterior jaw rash noted


Neurological: CN's grossly intact, no focal deficits


Psychiatric: normal affect, A&O x 3





Hosp A/P


(1) Sepsis


Code(s): A41.9 - SEPSIS, UNSPECIFIED ORGANISM   Status: Acute   





(2) Herpes zoster


Code(s): B02.9 - ZOSTER WITHOUT COMPLICATIONS   Status: Acute   





(3) Physical deconditioning


Code(s): R53.81 - OTHER MALAISE   Status: Acute   





(4) Pain


Code(s): R52 - PAIN, UNSPECIFIED   Status: Acute   





(5) Fever


Code(s): R50.9 - FEVER, UNSPECIFIED   Status: Acute   





(6) HARISH (obstructive sleep apnea)


Code(s): G47.33 - OBSTRUCTIVE SLEEP APNEA (ADULT) (PEDIATRIC)   Status: Acute   





(7) Rheumatoid arthritis


Code(s): M06.9 - RHEUMATOID ARTHRITIS, UNSPECIFIED   Status: Acute   





(8) H/O autoimmune encephalitis


Status: Chronic   





(9) HTN (hypertension)


Code(s): I10 - ESSENTIAL (PRIMARY) HYPERTENSION   Status: Chronic   





(10) Hypothyroid


Code(s): E03.9 - HYPOTHYROIDISM, UNSPECIFIED   Status: Chronic   





- Plan


Start broad spectrum antibiotics.


Continue acyclovir.


Restart tramadol.


start neurontin for neuropathi pain.


Continue other treatments


awaiting culture results.


Awaiting ID input.

## 2019-09-06 NOTE — CON
DATE OF CONSULTATION:  09/06/2019



REASON FOR CONSULTATION:  Herpes zoster.



HISTORY OF PRESENT ILLNESS:  An 80-year-old known to us from prior visits with a

history of autoimmune encephalitis and rheumatoid arthritis, she is on

corticosteroids and Orencia.  She has had a TKR infection and also UTIs, which led

to recent admissions, and now she presents with fever and pustules/vesicles in the

distribution of the 3rd branch of the trigeminal nerve, left side for the past 2 to

3 days, some headaches.  No visual symptoms, sore throat, odynophagia, or dysphagia.

 No hearing problems.  No neck pain.  No cough, sputum production, or chest pain.

No abdominal pain or diarrhea.  No genitourinary symptoms.  No joint symptoms. 



PAST MEDICAL HISTORY:  Autoimmune encephalitis;, rheumatoid arthritis;

osteonecrosis, left knee with TKR and postop infection revision; neurogenic bladder,

in and out caths, prior UTIs, and GERD. 



PAST SURGICAL HISTORY:  Hysterectomy, cholecystectomy, and knee replacement.



FAMILY HISTORY:  Noncontributory.



SOCIAL HISTORY:  Lives by herself.  Has supportive family members.  Never smoker.

No alcoholic beverage use. 



ALLERGIES:  SULFA AND LEVAQUIN WITH RASH.



CURRENT MEDICATIONS:  

1. Tylenol.

2. Acyclovir.

3. Norvasc.

4. Lovenox.

5. Lexapro.

6. Lasix.

7. Neurontin.

8. Leflunomide.

9. Synthroid.

10. Cozaar.

11. Zofran.

12. Protonix.

13. Zosyn.

14. Prevnar.

15. Prednisone 4 mg and 5 mg.

16. Tramadol.

17. Vancomycin.



PHYSICAL EXAMINATION:

VITAL SIGNS:  T-max 101.1, blood pressure 130/70, pulse 87, respirations 16, and O2

saturation is 95. 

SKIN:  Shows early vesicles and blisters in the left side of the jaw skin site

wrapping around the angle of the jaw and then lesion in the anterior auricular area,

left side, with a scabbed over crusted area of vesicles with a rim of erythema

surrounding it.  There is a little bit of erythema close to the pinna, left side,

but the external ear canal is clear.  The eye exam is completely normal.  There is

no evidence of involvement of the orbit, left side.  No other skin lesions.  No

lymphadenopathy.  The patient has a peripheral IV access. 

HEENT:  Ocular movements are conjugate.  Sclerae white.  Conjunctivae normal.

Pupils are equal.  Coronary is not involved.  Nasal passages are normal.  Oral

cavity normal.  Teeth in fairly decent shape. 

NECK:  Supple.  No jugular vein distention. 

LUNGS:  Symmetric, clear breath sounds. 

HEART:  S1 and S2, regular rate.  No S3 or S4. 

ABDOMEN:  Soft, not distended or tender.  No ascites.  No bladder distention. 

EXTREMITIES:  No joint inflammatory activity.  Moves extremities equally. 

NEUROLOGIC:  Cognitive function appears to be intact.



LABORATORY DATA:  Urinalysis was essentially normal except for 30 protein.  White

cell count 7.5, hemoglobin 12 and 10, MCV 76, platelets 191, 20% lymphocytes, 61%

neutrophils.  Creatinine 1.33, GFR 38.  This is kind of what her baseline

previously.  Liver profile normal.  Albumin 4.1.  Two sets of blood cultures thus

far no growth.  Urine culture, preliminary report 25,000 to 50,000 CFUs per mL mixed

skin roberta.  Reports include a chest x-ray with no evidence of acute cardiopulmonary

disease. 



ASSESSMENT:  History of autoimmune encephalitis, rheumatoid arthritis, neurogenic

bladder with in and out catheterization, prior episodes of invasive urinary tract

infection and now evidence of dermatomal herpes zoster in the 3rd trigeminal

distribution, left side. 



DISCUSSION:  The patient has dermatomal herpes zoster.  No evidence of ocular

involvement and no evidence of dissemination.  We will switch her to Valtrex p.o.

and once she is stable, then consider discharge planning to complete treatment in

the home setting. 







Job ID:  [id number]

## 2019-09-07 NOTE — PDOC.HOSPP
- Subjective


Encounter Date: 09/07/19


Encounter Time: 09:00


Subjective: 





pt has mandibular pain, no fever, has mild ear discomfort, c/o nausea today


Patient seen and examined. No new complaints. No overnight events





- Objective


Vital Signs & Weight: 


 Vital Signs (12 hours)











  Temp Pulse Resp BP BP Pulse Ox


 


 09/07/19 08:49   73   169/81 H  


 


 09/07/19 07:43  98.7 F  73  22 H   169/81 H  95


 


 09/07/19 04:10  98.2 F  85  18   147/71 H  96


 


 09/07/19 01:20   84    166/83 H 








 Weight











Weight                         198 lb














I&O: 


 











 09/06/19 09/07/19 09/08/19





 06:59 06:59 06:59


 


Intake Total 980  


 


Output Total 850 2400 


 


Balance 130 -2400 











Result Diagrams: 


 09/06/19 07:41





 09/06/19 07:41





Hospitalist ROS





- Review of Systems


ENT: reports: mouth pain.  denies: ear pain, ear discharge, nose pain, nose 

discharge, nose congestion, mouth swelling, throat pain, throat swelling, other


Respiratory: denies: cough, dry, shortness of breath, hemoptysis, SOB with 

excertion, pleuritic pain, sputum, wheezing, other


Cardiovascular: denies: chest pain, palpitations, orthopnea, paroxysmal noc. 

dyspnea, edema, light headedness, other


Gastrointestinal: denies: nausea, vomitting, abdominal pain, diarrhea, 

constipation, melena, hematochezia, other


Genitourinary: denies: dysuria, frequency, incontinence, hematuria, retention, 

other


Musculoskeletal: denies: neck pain, shoulder pain, arm pain, back pain, hand 

pain, leg pain, foot pain, other


Skin: reports: rash.  denies: lesions, brian, bruising, other





- Medication


Medications: 


Active Medications











Generic Name Dose Route Start Last Admin





  Trade Name Freq  PRN Reason Stop Dose Admin


 


Acetaminophen  650 mg  09/05/19 15:36  09/07/19 02:17





  Tylenol  PO   650 mg





  Q4H PRN   Administration





  Headache/Fever/Mild Pain (1-3)   





     





     





     


 


Amlodipine Besylate  5 mg  09/06/19 09:00  09/07/19 08:49





  Norvasc  PO   5 mg





  DAILY VIKKI   Administration





     





     





     





     


 


Enoxaparin Sodium  40 mg  09/06/19 09:00  09/07/19 08:52





  Lovenox  SC   40 mg





  0900 VIKKI   Administration





     





     





     





     


 


Escitalopram Oxalate  20 mg  09/06/19 09:00  09/07/19 08:51





  Lexapro  PO   20 mg





  DAILY VIKKI   Administration





     





     





     





     


 


Furosemide  20 mg  09/06/19 09:00  09/07/19 08:50





  Lasix  PO   20 mg





  DAILY VIKKI   Administration





     





     





     





     


 


Gabapentin  100 mg  09/06/19 15:00  09/07/19 08:50





  Neurontin  PO   100 mg





  TID VIKKI   Administration





     





     





     





     


 


Leflunomide  20 mg  09/06/19 09:00  09/07/19 08:51





  Arava  PO   20 mg





  DAILY VIKKI   Administration





     





     





     





     


 


Levothyroxine Sodium  100 mcg  09/06/19 06:00  09/07/19 05:35





  Synthroid  PO   100 mcg





  0600 VIKKI   Administration





     





     





     





     


 


Losartan Potassium  100 mg  09/06/19 09:00  09/07/19 08:48





  Cozaar  PO   100 mg





  DAILY VIKKI   Administration





     





     





     





     


 


Ondansetron HCl  4 mg  09/05/19 15:36  09/07/19 09:55





  Zofran Odt  PO   4 mg





  Q6H PRN   Administration





  Nausea/Vomiting   





     





     





     


 


Pantoprazole Sodium  40 mg  09/06/19 09:00  09/07/19 08:51





  Protonix  PO   40 mg





  DAILY VIKKI   Administration





     





     





     





     


 


Prednisone  4 mg  09/07/19 08:00  09/07/19 08:45





  Prednisone  PO   4 mg





  QAM-WM VIKKI   Administration





     





     





     





     


 


Prednisone  5 mg  09/07/19 08:00  09/07/19 08:45





  Prednisone  PO   5 mg





  QAM-WM VIKKI   Administration





     





     





     





     


 


Sodium Chloride  10 ml  09/06/19 21:00  09/07/19 08:52





  Flush - Normal Saline  IVF   10 ml





  Q12HR VIKKI   Administration





     





     





     





     


 


Tramadol HCl  100 mg  09/06/19 12:44  09/07/19 08:47





  Ultram  PO   100 mg





  Q6H PRN   Administration





  Moderate Pain (4-6)   





     





     





     


 


Valacyclovir HCl  1,000 mg  09/06/19 21:00  09/07/19 08:46





  Valtrex  PO   1,000 mg





  BID VIKKI   Administration





     





     





     





     














- Exam


General Appearance: NAD, awake alert


Eye: PERRL, anicteric sclera


ENT: normocephalic atraumatic, no oropharyngeal lesions


Neck: supple, symmetric, no JVD


Heart: RRR, no murmur, no gallops


Respiratory: CTAB, no wheezes, no rales, no ronchi


Gastrointestinal: soft, non-tender, non-distended, normal bowel sounds


Extremities: no cyanosis, no clubbing, no edema


Skin: normal turgor, no lesions


Skin - other findings: rash zoster over face


Neurological: CN's grossly intact, normal sensation to touch, no focal deficits


Musculoskeletal: normal tone, normal strength


Psychiatric: normal affect, normal behavior





Hosp A/P


(1) Herpes zoster


Code(s): B02.9 - ZOSTER WITHOUT COMPLICATIONS   Status: Acute   





(2) HARISH (obstructive sleep apnea)


Code(s): G47.33 - OBSTRUCTIVE SLEEP APNEA (ADULT) (PEDIATRIC)   Status: Chronic

   





(3) Rheumatoid arthritis


Code(s): M06.9 - RHEUMATOID ARTHRITIS, UNSPECIFIED   Status: Chronic   





(4) HTN (hypertension)


Code(s): I10 - ESSENTIAL (PRIMARY) HYPERTENSION   Status: Chronic   





(5) Hypothyroid


Code(s): E03.9 - HYPOTHYROIDISM, UNSPECIFIED   Status: Chronic   





(6) GERD (gastroesophageal reflux disease)


Code(s): K21.9 - GASTRO-ESOPHAGEAL REFLUX DISEASE WITHOUT ESOPHAGITIS   Status: 

Chronic   





(7) Anxiety and depression


Code(s): F41.9 - ANXIETY DISORDER, UNSPECIFIED; F32.9 - MAJOR DEPRESSIVE 

DISORDER, SINGLE EPISODE, UNSPECIFIED   Status: Chronic   





(8) CKD (chronic kidney disease) stage 3, GFR 30-59 ml/min


Code(s): N18.3 - CHRONIC KIDNEY DISEASE, STAGE 3 (MODERATE)   Status: Chronic   





(9) Microcytic anemia


Code(s): D50.9 - IRON DEFICIENCY ANEMIA, UNSPECIFIED   Status: Chronic   





(10) Lactic acidosis


Code(s): E87.2 - ACIDOSIS   Status: Resolved   





- Plan


old records reviewed/req, plan discussed w/ family





continue valcyclovir


medication reviewed as above


symptomatic treatment


overall stable

## 2019-09-11 NOTE — PQF
NOY CAMERON SALIM NOORJIBHAI MD

[id number]                                                             [id number]

[id number]                             

                                   

CLINICAL DOCUMENTATION CLARIFICATION FORM:  POST DISCHARGE



Addendum to original discharge summary date:  __________________________________
____



Late entry note date:  _________________________________________________________
__











DATE: 9/11/19                                                  ATTN: Radha Mchugh





Please exercise your independent, professional judgment in responding to the 
clarification form. 

Clinical indicators are provided on the bottom of this form for your review



Can you please further specify if Sepsis is ruled in or ruled out?



Sepsis

[  ] Ruled in diagnosis

     [  ] Continue to treat        [  ] Resolved

[x  ] Ruled out diagnosis

[  ] Cannot rule out diagnosis

[  ] Other diagnosis please specify___________

[  ] Unable to determine



In addition, please specify:

Present on Admission (POA):  [  ] Yes             [ x ] No             [  ] 
Unable to determine



For continuity of documentation, please document condition throughout progress 
notes and discharge summary.  Thank You.



CLINICAL INDICATORS 

H&P pg2 9/6 Dr. Mondragon- "/80, SC 95, RR 21, Temp 99"

H&P pg2 9/6 Dr. Mondragon- "Lab data: WBC 7.5, lactic acid"

H&P pg3 9/6 Dr. Mondragon- elevated lactic acid. I assume this is more related 
to a viral sepsis type syndrome.

Hospital PN 9/6 pg4  Obi- Sepsis

DS pg2 9/8 Dr. Hoyt- was suspected for infection and that is why antibiotic 
was started, but Dr. Jackson was consulted for rash and he thought that this 
patient has herpes zoster and antibiotic was discontinued





RISK FACTORS

Obese-DS 9/8 Dr. Hoyt

Herpes Zoster- DS 9/8 Dr. Hoyt

Lactic Acidosis-DS 9/8 Dr. Hoyt





TREATMENTS

Vancomycin 1 gm IV-MAR 9/5

Zovirax 900mg- MAR 9/5

Infectious Consult-Consult 9/6 Dr. Jackson

IV Fluids- MAR 9/5

Chest X-ray 9/5/19







(This form is maintained as a part of the permanent medical record)

2015 [employer]  All Rights Reserved

[email or web address]    [not provided]

                                                              



 

MTDD

## 2019-10-29 NOTE — MMO
Bilateral MAMMO Bilat Screen DDI+DAMARIS.

 

CLINICAL HISTORY:

Patient is 80 years old and is seen for screening. The patient has no family

history of breast cancer.  The patient has no personal history of cancer.

 

VIEWS:

The views performed were:  bilateral craniocaudal with tomosynthesis; bilateral

mediolateral oblique with tomosynthesis; and left mediolateral oblique.

 

FILMS COMPARED:

The present examination has been compared to a prior imaging study performed at

[facility] on 10/19/2018.

 

This study has been interpreted with the assistance of computer-aided detection.

 

MAMMOGRAM FINDINGS:

There are scattered fibroglandular densities.

 

There are stable benign appearing calcifications seen in both breasts.

 

There are no suspicious masses, calcifications or areas of architectural

distortion.

 

There are no suspicious masses, suspicious calcifications, or new areas of

architectural distortion.

 

IMPRESSION:

THERE IS NO MAMMOGRAPHIC EVIDENCE OF MALIGNANCY.

 

A ROUTINE FOLLOW-UP MAMMOGRAM IN 1 YEAR IS RECOMMENDED.

 

THE RESULTS OF THIS EXAM WERE SENT TO THE PATIENT.

 

ACR BI-RADS Category 2 - Benign finding

 

MAMMOGRAPHY NOTE:

 1. A negative mammogram report should not delay a biopsy if a dominant of

 clinically suspicious mass is present.

 2. Approximately 10% to 15% of breast cancers are not detected by

 mammography.

 3. Adenosis and dense breasts may obscure an underlying neoplasm.

 

 

Reported by: KASI CRUZ MD

Electonically Signed: [id number]

## 2020-01-02 NOTE — RAD
EXAM:

Chest Two Views



1/2/2020 12:38 PM



HISTORY:

Weakness



COMPARISON:

September 5, 2019 chest radiograph



FINDINGS:

Heart: Normal in size and contour.



Pulmonary vessels: Normal.



Costophrenic angles: Clear.



Lungs: No acute airspace consolidation.



Pneumothorax: None.



Osseous structures:Intact.



Additional findings:   There is stable elevation the right hemidiaphragm. Cholecystectomy clips are a
gain seen within the right upper quadrant. Vascular calcifications of the aortic arch are stable.



IMPRESSION:

No significant acute intrathoracic disease.



Reported By: Greyson Richter 

Electronically Signed:  1/2/2020 12:38 PM

## 2020-01-12 NOTE — RAD
Upper GI air contrast.



HISTORY: Nausea vomiting. Diarrhea.



FINDINGS: Air contrast and single column barium evaluation shows small sliding hiatal hernia. Very la
rge amount of gastroesophageal reflux. There is diminished primary and secondary peristalsis.

Prominent nonpropulsive tertiary type contractions of the esophagus.



No focal gastric lesions. There was markedly diminished peristalsis of the stomach. Duodenum is unrem
arkable.



Fluoroscopy time 1.3 minutes







IMPRESSION: Small hiatal hernia with large amount of reflux.



Severe presbyesophagus.



Prominent gastroparesis.



Reported By: HARESH Scales 

Electronically Signed:  1/6/2020 10:06 AM
Normal for race

## 2020-01-25 NOTE — RAD
Portable chest:



HISTORY: Weakness



COMPARISON:  6/27/2019



FINDINGS: Lung fields are clear. Heart and mediastinum appear unremarkable.  Vascularity is normal. E
levated right hemidiaphragm is stable.

 Visualized osseous structures unremarkable.



IMPRESSION: No acute finding



Reported By: Issa Almeida 

Electronically Signed:  1/25/2020 11:20 AM

## 2020-01-25 NOTE — CT
EXAM: CT brain without contrast



HISTORY: Fever and lethargy. Altered mental status.



COMPARISON: 5/28/2016



TECHNIQUE: Multiple contiguous axial images were obtained and a CT of the brain without contrast.



FINDINGS: The brain is normal in morphology and attenuation without focal lesions or confluent areas 
of infarction. There is no evidence of hydrocephalus, intracranial hemorrhage, or extra-axial fluid

collection.



The calvarium and overlying soft tissues are unremarkable. The visualized paranasal sinuses and masto
id air cells are well aerated.



IMPRESSION: No evidence of acute intracranial abnormality



Reported By: Ben Mccloud 

Electronically Signed:  1/25/2020 11:52 AM

## 2020-01-25 NOTE — PDOC.HHP
Hospitalist HPI





- History of Present Illness


Flu symptoms


History of Present Illness: 





80-year-old female with past medical history of rheumatoid arthritis on 

injectable disease modifying antirheumatic drugs, congestive heart failure, see 

Demetria stage III, hypertension, obstructive sleep apnea, GERD, hypothyroidism, 

and elevated BMI presents with flu symptoms. Yesterday patient started filling 

generalized malaise and had a fever of over 101.6 last night. This morning she 

was found by her caretaker confused, not knowing who she was, not knowing where 

she was at, or what she was doing. Patient was lethargic and had no energy. 

Patient with wheezing and mild cough. Patient denies other symptoms including 

nausea, vomiting, diarrhea. Patient denies sick contacts though she has been to 

the doctors office and possibly pick something up there. Patient admitted to 

medical unit with telemetry for further evaluation. Patient diagnosed with 

sepsis on admission.





Hospitalist ROS





- Review of Systems


All other systems reviewed; all pertinent +/- noted in HPI/Subj





Hospitalist History





- Past Medical History


Source: patient, family, old records


Cardiac: reports: CHF, HTN, Hyperlipidemia


Pulmonary: reports: Other (obstructive sleep apnea)


Gastrointestinal: reports: GERD


Rheumatologic: reports: Rheumatoid arthritis





- Past Surgical History


Past Surgical History: reports: Other





- Family History


Family History: reports: hyperlipidemia





- Social History


Smoking Status: Never smoker


Alcohol: reports: None


Drugs: reports: none


Living Situation: With Family


Domestic Violence: Negative


Activity level: uses cane/walker





- Exam


General Appearance: NAD, awake alert


Eye: PERRL


ENT: normocephalic atraumatic, moist mucosa


Neck: supple, symmetric, no lymphadenopathy


Heart: no murmur, no gallops, no rubs


Respiratory: no rales, no ronchi, no tachypnea, wheezes (few faint)


Gastrointestinal: soft, non-tender, no guarding, no rigidity


Extremities: no edema


Skin: no lesions, no rashes


Neurological: cranial nerve grossly intact, no focal deficits


Musculoskeletal: generalized weakness


Psychiatric: normal affect, normal behavior, A&O x 3





Hospitalist Results





- Labs


Result Diagrams: 


 01/25/20 10:47





 01/25/20 10:47


Lab results: 


 











WBC  4.6 thou/uL (4.8-10.8)  L  01/25/20  10:47    


 


Hgb  11.6 g/dL (12.0-16.0)  L  01/25/20  10:47    


 


Hct  35.1 % (36.0-47.0)  L  01/25/20  10:47    


 


MCV  78.5 fL (78.0-98.0)   01/25/20  10:47    


 


Plt Count  259 thou/uL (130-400)   01/25/20  10:47    


 


Sodium  138 mmol/L (136-145)   01/25/20  10:47    


 


Potassium  4.4 mmol/L (3.5-5.1)   01/25/20  10:47    


 


Chloride  97 mmol/L ()  L  01/25/20  10:47    


 


Carbon Dioxide  26 mmol/L (23-31)   01/25/20  10:47    


 


BUN  22 mg/dL (9.8-20.1)  H  01/25/20  10:47    


 


Creatinine  1.49 mg/dL (0.6-1.1)  H  01/25/20  10:47    


 


Glucose  130 mg/dL ()  H  01/25/20  10:47    


 


Lactic Acid  1.4 mmol/L (0.5-2.2)   01/25/20  10:47    


 


Calcium  9.7 mg/dL (7.8-10.44)   01/25/20  10:47    


 


Total Bilirubin  0.6 mg/dL (0.2-1.2)   01/25/20  10:47    


 


AST  42 U/L (5-34)  H  01/25/20  10:47    


 


ALT  26 U/L (8-55)   01/25/20  10:47    


 


Alkaline Phosphatase  70 U/L ()   01/25/20  10:47    


 


CK-MB (CK-2)  0.5 ng/mL (0-6.6)   01/25/20  10:47    


 


Troponin I  0.109 ng/mL (< 0.028)  H  01/25/20  14:00    


 


Serum Total Protein  7.7 g/dL (6.0-8.3)   01/25/20  10:47    


 


Albumin  4.2 g/dL (3.4-4.8)   01/25/20  10:47    


 


Urine Ketones  Negative mg/dL (Negative)   01/25/20  11:03    


 


Urine Blood  Negative  (Negative)   01/25/20  11:03    


 


Urine Nitrite  Negative  (Negative)   01/25/20  11:03    


 


Ur Leukocyte Esterase  Negative Dior/uL (Negative)   01/25/20  11:03    














- Radiology Interpretation


  ** CT scan - head


Status: image reviewed by me





  ** Chest x-ray


Status: image reviewed by me





Hospitalist H&P A/P





- Problem


(1) Influenza A


Code(s): J10.1 - FLU DUE TO OTH IDENT INFLUENZA VIRUS W OTH RESP MANIFEST   

Status: Acute   





(2) Bronchitis


Code(s): J40 - BRONCHITIS, NOT SPECIFIED AS ACUTE OR CHRONIC   Status: Acute   





(3) Shortness of breath


Code(s): R06.02 - SHORTNESS OF BREATH   Status: Acute   





(4) Altered mental state


Code(s): R41.82 - ALTERED MENTAL STATUS, UNSPECIFIED   Status: Acute   





(5) Sepsis


Code(s): A41.9 - SEPSIS, UNSPECIFIED ORGANISM   Status: Acute   





(6) Physical deconditioning


Code(s): R53.81 - OTHER MALAISE   Status: Acute   





(7) Anxiety and depression


Code(s): F41.9 - ANXIETY DISORDER, UNSPECIFIED; F32.9 - MAJOR DEPRESSIVE 

DISORDER, SINGLE EPISODE, UNSPECIFIED   Status: Chronic   





(8) CKD (chronic kidney disease) stage 3, GFR 30-59 ml/min


Code(s): N18.3 - CHRONIC KIDNEY DISEASE, STAGE 3 (MODERATE)   Status: Chronic   





(9) GERD (gastroesophageal reflux disease)


Code(s): K21.9 - GASTRO-ESOPHAGEAL REFLUX DISEASE WITHOUT ESOPHAGITIS   Status: 

Chronic   





(10) HTN (hypertension)


Code(s): I10 - ESSENTIAL (PRIMARY) HYPERTENSION   Status: Chronic   





(11) Hypothyroid


Code(s): E03.9 - HYPOTHYROIDISM, UNSPECIFIED   Status: Chronic   





(12) Microcytic anemia


Code(s): D50.9 - IRON DEFICIENCY ANEMIA, UNSPECIFIED   Status: Chronic   





(13) HARISH (obstructive sleep apnea)


Code(s): G47.33 - OBSTRUCTIVE SLEEP APNEA (ADULT) (PEDIATRIC)   Status: Chronic

   





(14) Rheumatoid arthritis


Code(s): M06.9 - RHEUMATOID ARTHRITIS, UNSPECIFIED   Status: Chronic   





- Plan


Plan: 





Plan:


Admit to medical unit with telemetry


infectious disease consultation, recommendations appreciated


Tamiflu for antiviral therapy


patient with bronchitis start pulmonary specific antibiotics empirically


patient is immunosuppressed on injectable disease modifying antirheumatic drugs


on chronic steroids


breathing treatments scheduled and as needed


echocardiogram to monitor for structural abnormalities of the heart and 

ejection fraction


tachycardia, elevated troponins with demand ischemia


type II NSTEMI diagnosed on admission


continue other home medications as able


blood pressure control


blood sugar control


DVT prophylaxis


G.I. prophylaxis

## 2020-01-26 NOTE — PDOC.HOSPP
- Subjective


Subjective: 





Patient states that she feels 100% better. Denies wheezing this a.m. Denies 

cough. Denies urinary tract infection symptoms, though she does have chronic 

neurogenic bladder and straight caths scheduled at baseline. Anderson catheter in 

place at this time, if she remains hospitalized for any extended length I will 

remove this Anderson catheter. Patient with E. coli in the urine, though urine 

analysis does not suggest urinary tract infection, this is likely colonization. 

Regardless ceftriaxone will have adequate coverage for E. coli.





- Objective


Vital Signs & Weight: 


 Vital Signs (12 hours)











  Temp Pulse Pulse Resp BP BP Pulse Ox


 


 01/26/20 11:22  97.5 F L  109 H   24 H   134/67  95


 


 01/26/20 10:44   100   16   


 


 01/26/20 10:00    79   175/78 H  


 


 01/26/20 07:40  97.6 F  79   16   175/78 H  96


 


 01/26/20 03:35  98.0 F  89   18   150/70 H  94 L














  Pulse Ox


 


 01/26/20 11:22 


 


 01/26/20 10:44 


 


 01/26/20 10:00  96


 


 01/26/20 07:40 


 


 01/26/20 03:35 








 Weight











Weight                         194 lb 8 oz














I&O: 


 











 01/25/20 01/26/20 01/27/20





 06:59 06:59 06:59


 


Intake Total  120 


 


Output Total  350 


 


Balance  -230 











Result Diagrams: 


 01/26/20 04:18





 01/26/20 04:18


Radiology Reviewed by me: Yes





Hospitalist ROS





- Review of Systems


All other systems reviewed; all pertinent +/- noted in HPI/Subj





- Medication


Medications: 


Active Medications











Generic Name Dose Route Start Last Admin





  Trade Name [place]  PRN Reason Stop Dose Admin


 


Albuterol/Ipratropium  3 ml  01/25/20 19:00  01/26/20 10:44





  Duoneb  NEB   3 ml





  [id number] VIKKI   Administration





     





     





     





     


 


Amlodipine Besylate  5 mg  01/25/20 21:00  01/25/20 21:35





  Norvasc  PO   5 mg





  QPM VIKKI   Administration





     





     





     





     


 


Cholecalciferol  1,000 units  01/26/20 09:00  01/26/20 09:56





  Vitamin D3  PO   1,000 units





  DAILY VIKKI   Administration





     





     





     





     


 


Escitalopram Oxalate  20 mg  01/26/20 09:00  01/26/20 09:56





  Lexapro  PO   20 mg





  DAILY VIKKI   Administration





     





     





     





     


 


Ferrous Sulfate  325 mg  01/26/20 09:00  01/26/20 09:57





  Feosol  PO   325 mg





  DAILY VIKKI   Administration





     





     





     





     


 


Furosemide  20 mg  01/26/20 09:00  01/26/20 09:57





  Lasix  PO   20 mg





  DAILY VIKKI   Administration





     





     





     





     


 


Heparin Sodium (Porcine)  5,000 units  01/25/20 21:00  01/26/20 09:55





  Heparin  SC   5,000 units





  TID VIKKI   Administration





     





     





     





     


 


Azithromycin 500 mg/ Sodium  250 mls @ 250 mls/hr  01/25/20 16:00  01/25/20 17:

18





  Chloride  IVPB   250 mls





  1600 VIKKI   Administration





     





     





     





     


 


Ceftriaxone Sodium 2 gm/  100 mls @ 200 mls/hr  01/25/20 18:00  01/25/20 18:04





  Sodium Chloride  IVPB   100 mls





  Q24HR VIKKI   Administration





     





     





     





     


 


Levothyroxine Sodium  100 mcg  01/26/20 06:00  01/26/20 06:00





  Synthroid  PO   100 mcg





  0600 VIKKI   Administration





     





     





     





     


 


Losartan Potassium  100 mg  01/26/20 09:00  01/26/20 09:55





  Cozaar  PO   100 mg





  DAILY VIKKI   Administration





     





     





     





     


 


Multivitamins  1 tab  01/26/20 09:00  01/26/20 09:57





  Theragran  PO   1 tab





  DAILY VIKKI   Administration





     





     





     





     


 


Oseltamivir Phosphate  75 mg  01/25/20 21:00  01/26/20 09:57





  Tamiflu  PO  01/30/20 09:01  75 mg





  BID VIKKI   Administration





     





     





     





     


 


Prednisone  7.5 mg  01/26/20 09:00  01/26/20 09:56





  Prednisone  PO   7.5 mg





  DAILY VIKKI   Administration





     





     





     





     


 


Tramadol HCl  100 mg  01/25/20 17:37  01/25/20 21:40





  Ultram  PO   100 mg





  TIDPRN PRN   Administration





  Pain 1-5   





     





     





     


 


Valacyclovir HCl  1,000 mg  01/25/20 21:00  01/26/20 09:55





  Valtrex  PO   1,000 mg





  BID VIKKI   Administration





     





     





     





     














- Exam


General Appearance: NAD, awake alert


Eye: anicteric sclera


ENT: normocephalic atraumatic, moist mucosa


Neck: supple, symmetric, no lymphadenopathy


Heart: no murmur, no gallops, no rubs


Respiratory: CTAB, no wheezes, no rales, no ronchi, normal chest expansion


Gastrointestinal: soft, non-tender, no guarding, no rigidity


Extremities: no edema


Skin: no lesions, no rashes


Neurological: cranial nerve grossly intact, no focal deficits


Musculoskeletal: generalized weakness


Psychiatric: normal affect, normal behavior, A&O x 3





Hosp A/P


(1) Influenza A


Code(s): J10.1 - FLU DUE TO OTH IDENT INFLUENZA VIRUS W OTH RESP MANIFEST   

Status: Acute   





(2) Bronchitis


Code(s): J40 - BRONCHITIS, NOT SPECIFIED AS ACUTE OR CHRONIC   Status: Acute   





(3) Shortness of breath


Code(s): R06.02 - SHORTNESS OF BREATH   Status: Acute   





(4) Altered mental state


Code(s): R41.82 - ALTERED MENTAL STATUS, UNSPECIFIED   Status: Acute   





(5) Sepsis


Code(s): A41.9 - SEPSIS, UNSPECIFIED ORGANISM   Status: Acute   





(6) Physical deconditioning


Code(s): R53.81 - OTHER MALAISE   Status: Acute   





(7) Anxiety and depression


Code(s): F41.9 - ANXIETY DISORDER, UNSPECIFIED; F32.9 - MAJOR DEPRESSIVE 

DISORDER, SINGLE EPISODE, UNSPECIFIED   Status: Chronic   





(8) CKD (chronic kidney disease) stage 3, GFR 30-59 ml/min


Code(s): N18.3 - CHRONIC KIDNEY DISEASE, STAGE 3 (MODERATE)   Status: Chronic   





(9) GERD (gastroesophageal reflux disease)


Code(s): K21.9 - GASTRO-ESOPHAGEAL REFLUX DISEASE WITHOUT ESOPHAGITIS   Status: 

Chronic   





(10) HTN (hypertension)


Code(s): I10 - ESSENTIAL (PRIMARY) HYPERTENSION   Status: Chronic   





(11) Hypothyroid


Code(s): E03.9 - HYPOTHYROIDISM, UNSPECIFIED   Status: Chronic   





(12) Microcytic anemia


Code(s): D50.9 - IRON DEFICIENCY ANEMIA, UNSPECIFIED   Status: Chronic   





(13) HARISH (obstructive sleep apnea)


Code(s): G47.33 - OBSTRUCTIVE SLEEP APNEA (ADULT) (PEDIATRIC)   Status: Chronic

   





(14) Rheumatoid arthritis


Code(s): M06.9 - RHEUMATOID ARTHRITIS, UNSPECIFIED   Status: Chronic   





- Plan








Plan:


Medical unit with telemetry


infectious disease consultation, recommendations appreciated


Tamiflu for influenza positive


patient with wheezing and cough on admission likely early bronchitis


CXR negative for focal PNA


empiric pulmonary specific antibiotics


continue chronic steroids


breathing treatments scheduled and as needed


patient with chronic neurogenic bladder who performed straight caths scheduled 

at baseline


urine analysis is negative for urinary tract infection


urine culture with E. coli, this is likely colonization rather than acute 

infection.


echocardiogram to monitor for structural abnormalities of the heart and 

ejection fraction


tachycardia with elevated troponins, likely demand ischemia


type II NSTEMI diagnosed on admission


continue other home medications as able


blood pressure control next line blood sugar control


G.I. prophylaxis


DVT prophylaxis

## 2020-01-26 NOTE — CON
DATE OF CONSULTATION:  01/26/2020



REASON FOR CONSULTATION:  Influenza A.



HISTORY OF PRESENT ILLNESS:  An 80-year-old known to me from multiple prior visits,

who has a history of autoimmune encephalitis and rheumatoid arthritis, on

corticosteroids and Orencia chronically; prior TKR infection; and recurrent UTIs,

who developed recent outbreak of herpes zoster for which I treated her in September.

 After discharge, she did well until now when she developed weakness, confusional

state, sore throat, fever, myalgias.  Initial findings included a pulse 124,

temperature 99.3, O2 saturation 88%, temperature went up to 101.8 in the emergency

room.  Exam showed tachycardia.  The lung exam was normal.  Heart examination was

normal.  Abdomen was soft, nontender.  She appeared oriented while awake and alert,

although she was somnolent.  Initial findings included normal urinalysis.  White

cell count 4.6, hemoglobin 11.6, platelets 259 with 19% lymphocytes, 68%

neutrophils.  Creatinine was 1.49 with a baseline of 0.96 in June 2019, and more

recently was 1.16 at baseline.  Actually two sets of blood cultures thus far

negative.  Influenza A antigen positive nasal swab.  The patient has been started on

azithromycin, ceftriaxone, and oseltamivir.  Currently, she is feeling much better.

She denies any headaches.  No visual symptoms, sore throat, or dental pain.  No back

pain.  No cough.  No chest pain.  No abdominal pain or diarrhea.  No genitourinary

symptoms except for the urinary retention.  No joint symptoms except for chronic

rheumatoid arthritis associated symptoms. 



PAST MEDICAL HISTORY:  Autoimmune encephalitis, rheumatoid arthritis, osteonecrosis

of left knee with TKR, postop infection with revision, neurogenic bladder with in

and out caths with prior invasive UTIs, GERD. 



PAST SURGICAL HISTORY:  Also includes hysterectomy, cholecystectomy.



FAMILY HISTORY:  Noncontributory.



SOCIAL HISTORY:  Lives alone, quite independent.  Supportive family members.  Never

smoker. 



ALLERGIES:  SULFA AND LEVAQUIN WITH RASH.



CURRENT MEDICATIONS:  

1. DuoNeb.

2. Norvasc.

3. Azithromycin.

4. Rocephin.

5. Pepcid.

6. Furosemide.

7. Lasix.

8. Imodium.

9. Cozaar.

10. Zofran.

11. Tamiflu.

12. Prednisone.



PHYSICAL EXAMINATION:

VITAL SIGNS:  Thus far, she has been afebrile after admission, blood pressure

130/60, pulse is 109. 

GENERAL:  Awake, alert, cushingoid features. 

HEENT:  Ocular movements conjugate.  Oral cavity normal. 

SKIN:  Normal peripheral IV access. 

:  Indwelling Anderson catheter. 

NECK:  Supple. 

LUNGS:  Symmetric.  Clear breath sounds. 

HEART:  S1 and S2.  Regular rate. 

ABDOMEN:  Soft, not distended. 

NEUROLOGIC:  Moves extremities equally.  She is awake and oriented.  Follows

commands. 



LABORATORY DATA:  White cell count is at 5.6, hemoglobin 9.4, MCV 81, platelets 207,

neutrophils 77%.  Sodium 140, creatinine 1.16.  Urine culture with E coli, likely

just a colonizer.  I would not treat this finding.  Brain CT with no acute

intracranial abnormality.  Chest x-ray with no findings of significance. 



ASSESSMENT:  

1. History of autoimmune encephalitis, on chronic corticosteroids.

2. Influenza A.



DISCUSSION:  The patient will be continued on Tamiflu.  Discontinue other

antimicrobials once there is further clinical improvement and discharge planning on

Tamiflu to complete a total of 10 days since she is immunocompromised.  She is at

risk for developing post influenza complications, but at this moment, it does not

appear that is the case. 







Job ID:  [id number]

## 2020-01-27 NOTE — PRG
DATE OF SERVICE:  01/27/2020



SUBJECTIVE:  Ms. Mullins today is feeling worse, having more coughing spells with

darker sputum production, a little bit of dyspnea and feels weak in general.  No

headaches.  No diarrhea.  No genitourinary symptoms. 



OBJECTIVE:  VITAL SIGNS:  T-max 100, now she is 100.9 just a while ago.  Blood

pressure 120/58, pulse 101, respirations 15, O2 saturation 95. 

GENERAL:  Awake, alert, and oriented. 

HEENT:  Ocular movements conjugate. 

LUNGS:  With scattered expiratory wheezing and a few crackles scattered as well.

Diminished breath sounds in the right base. 

HEART:  S1 and S2, regular rate. 

ABDOMEN:  Soft, not distended. 

EXTREMITIES:  Moves extremities equally.



LABORATORY DATA:  Sodium 140, creatinine 1.16.  White cell count 5.6, hemoglobin

9.4, and platelets 207.  Microbiology with blood cultures negative.  Last chest

x-ray was from admission on 25th. 



ASSESSMENT:  Influenza A, now with possible development of bacterial superinfection.

 Repeat chest x-ray.  Start broad-spectrum coverage with cefepime and vancomycin. 







Job ID:  [id number]

## 2020-01-27 NOTE — RAD
PORTABLE CHEST:

 

History: Cough, fever. 

 

Comparison: 9-5-19

 

FINDINGS: 

Elevated right hemidiaphragm is again noted. Lungs show no evidence of focal infiltrate. Heart size u
pper normal and stable. Vascular markings normal. 

 

IMPRESSION: 

No acute finding. No interval change apparent.

 

POS: AGW

## 2020-01-27 NOTE — PDOC.HOSPP
- Subjective


Subjective: 





Was feeling much better this a.m. Breathing comfortably on room air. Low-grade 

temperature though she is not actually febrile. Still with general malaise from 

the flu.





- Objective


Vital Signs & Weight: 


 Vital Signs (12 hours)











  Temp Pulse Pulse Pulse Resp BP BP


 


 01/27/20 14:10   100    14  


 


 01/27/20 13:50        136/72


 


 01/27/20 11:27       


 


 01/27/20 11:22  100.0 F H  105 H    22 H   196/92 H


 


 01/27/20 09:28    94  92   177/81 H 


 


 01/27/20 07:19  98.4 F  91    13   134/63


 


 01/27/20 04:00  98.6 F  84    20   134/64














  Pulse Ox Pulse Ox Pulse Ox


 


 01/27/20 14:10   


 


 01/27/20 13:50   


 


 01/27/20 11:27  97  


 


 01/27/20 11:22  97  


 


 01/27/20 09:28   90 L  97


 


 01/27/20 07:19  97  


 


 01/27/20 04:00  95  








 Weight











Weight                         194 lb 8 oz














I&O: 


 











 01/26/20 01/27/20 01/28/20





 06:59 06:59 06:59


 


Intake Total 120 960 


 


Output Total 350 1000 


 


Balance -230 -40 











Result Diagrams: 


 01/26/20 04:18





 01/26/20 04:18


Radiology Reviewed by me: Yes





Hospitalist ROS





- Review of Systems


All other systems reviewed; all pertinent +/- noted in HPI/Subj





- Medication


Medications: 


Active Medications











Generic Name Dose Route Start Last Admin





  Trade Name Freq  PRN Reason Stop Dose Admin


 


Acetaminophen  650 mg  01/25/20 17:37  01/27/20 12:28





  Tylenol  PO   650 mg





  Q6H PRN   Administration





  PAIN 1-5   





     





     





     


 


Albuterol/Ipratropium  3 ml  01/25/20 19:00  01/27/20 14:10





  Duoneb  NEB   3 ml





  [id number] VIKKI   Administration





     





     





     





     


 


Bisacodyl  10 mg  01/25/20 15:05  01/27/20 08:50





  Dulcolax  PO   10 mg





  DAILYPRN PRN   Administration





  Constipation   





     





     





     


 


Cholecalciferol  1,000 units  01/26/20 09:00  01/27/20 08:37





  Vitamin D3  PO   1,000 units





  DAILY VIKKI   Administration





     





     





     





     


 


Escitalopram Oxalate  20 mg  01/26/20 09:00  01/27/20 08:38





  Lexapro  PO   20 mg





  DAILY VIKKI   Administration





     





     





     





     


 


Famotidine  20 mg  01/26/20 21:00  01/26/20 19:59





  Pepcid  PO   20 mg





  2100 VIKKI   Administration





     





     





     





     


 


Ferrous Sulfate  325 mg  01/26/20 09:00  01/27/20 08:38





  Feosol  PO   325 mg





  DAILY VIKKI   Administration





     





     





     





     


 


Furosemide  20 mg  01/26/20 09:00  01/27/20 08:38





  Lasix  PO   20 mg





  DAILY VIKKI   Administration





     





     





     





     


 


Heparin Sodium (Porcine)  5,000 units  01/25/20 21:00  01/27/20 08:50





  Heparin  SC   5,000 units





  TID VIKKI   Administration





     





     





     





     


 


Levothyroxine Sodium  100 mcg  01/26/20 06:00  01/27/20 05:51





  Synthroid  PO   100 mcg





  0600 VIKKI   Administration





     





     





     





     


 


Losartan Potassium  100 mg  01/26/20 09:00  01/27/20 08:38





  Cozaar  PO   100 mg





  DAILY VIKKI   Administration





     





     





     





     


 


Multivitamins  1 tab  01/26/20 09:00  01/27/20 08:37





  Theragran  PO   1 tab





  DAILY VIKKI   Administration





     





     





     





     


 


Oseltamivir Phosphate  75 mg  01/25/20 21:00  01/27/20 08:37





  Tamiflu  PO  01/30/20 09:01  75 mg





  BID VIKKI   Administration





     





     





     





     


 


Prednisone  7.5 mg  01/26/20 09:00  01/27/20 08:50





  Prednisone  PO   7.5 mg





  DAILY VIKKI   Administration





     





     





     





     


 


Tramadol HCl  100 mg  01/25/20 17:37  01/25/20 21:40





  Ultram  PO   100 mg





  TIDPRN PRN   Administration





  Pain 1-5   





     





     





     


 


Valacyclovir HCl  1,000 mg  01/25/20 21:00  01/27/20 08:37





  Valtrex  PO   1,000 mg





  BID VIKKI   Administration





     





     





     





     














- Exam


General Appearance: NAD, awake alert


Eye: anicteric sclera


ENT: normocephalic atraumatic, moist mucosa


Neck: supple, symmetric, no lymphadenopathy


Heart: no murmur, no gallops, no rubs


Respiratory: CTAB, no wheezes, no rales, no ronchi, normal chest expansion


Gastrointestinal: soft, non-tender, no guarding, no rigidity


Extremities: no edema


Skin: no lesions


Neurological: cranial nerve grossly intact, no focal deficits


Musculoskeletal: generalized weakness


Psychiatric: normal affect, normal behavior, A&O x 3





Hosp A/P


(1) Influenza A


Code(s): J10.1 - FLU DUE TO OTH IDENT INFLUENZA VIRUS W OTH RESP MANIFEST   

Status: Acute   





(2) Bronchitis


Code(s): J40 - BRONCHITIS, NOT SPECIFIED AS ACUTE OR CHRONIC   Status: Acute   





(3) Shortness of breath


Code(s): R06.02 - SHORTNESS OF BREATH   Status: Acute   





(4) Altered mental state


Code(s): R41.82 - ALTERED MENTAL STATUS, UNSPECIFIED   Status: Acute   





(5) Sepsis


Code(s): A41.9 - SEPSIS, UNSPECIFIED ORGANISM   Status: Acute   





(6) Physical deconditioning


Code(s): R53.81 - OTHER MALAISE   Status: Acute   





(7) Anxiety and depression


Code(s): F41.9 - ANXIETY DISORDER, UNSPECIFIED; F32.9 - MAJOR DEPRESSIVE 

DISORDER, SINGLE EPISODE, UNSPECIFIED   Status: Chronic   





(8) CKD (chronic kidney disease) stage 3, GFR 30-59 ml/min


Code(s): N18.3 - CHRONIC KIDNEY DISEASE, STAGE 3 (MODERATE)   Status: Chronic   





(9) GERD (gastroesophageal reflux disease)


Code(s): K21.9 - GASTRO-ESOPHAGEAL REFLUX DISEASE WITHOUT ESOPHAGITIS   Status: 

Chronic   





(10) HTN (hypertension)


Code(s): I10 - ESSENTIAL (PRIMARY) HYPERTENSION   Status: Chronic   





(11) Hypothyroid


Code(s): E03.9 - HYPOTHYROIDISM, UNSPECIFIED   Status: Chronic   





(12) Microcytic anemia


Code(s): D50.9 - IRON DEFICIENCY ANEMIA, UNSPECIFIED   Status: Chronic   





(13) HARISH (obstructive sleep apnea)


Code(s): G47.33 - OBSTRUCTIVE SLEEP APNEA (ADULT) (PEDIATRIC)   Status: Chronic

   





(14) Rheumatoid arthritis


Code(s): M06.9 - RHEUMATOID ARTHRITIS, UNSPECIFIED   Status: Chronic   





- Plan








Plan:


Medical unit with telemetry


Cardiology consultation, recommendations appreciated


infectious disease consultation, recommendations appreciated


echocardiogram to monitor for structural abnormalities of the heart and 

ejection fraction


tachycardia with elevated troponins, likely demand ischemia


type II NSTEMI diagnosed on admission


BP not controlled


Tamiflu for influenza positive


patient with wheezing and cough on admission likely early bronchitis


CXR negative for focal PNA


empiric pulmonary specific antibiotics, stopped by ID


continue chronic steroids


breathing treatments scheduled and as needed


patient with chronic neurogenic bladder who performed straight caths scheduled 

at baseline


urine analysis is negative for urinary tract infection


urine culture with E. coli, this is likely colonization rather than acute 

infection.


continue other home medications as able


blood pressure control next line blood sugar control


G.I. prophylaxis


DVT prophylaxis

## 2020-01-28 NOTE — PRG
DATE OF SERVICE:  01/28/2020



SUBJECTIVE:  Feeling better today.  Coughing less.  The sputum is clear and still

having a little bit of temperature. 



OBJECTIVE:  VITAL SIGNS:  Last high temperature was 103.2 yesterday and then 100.1

earlier today and now she is 98.7, O2 saturations 95% with nasal cannula. 

GENERAL:  She is not tachycardic anymore. 

LUNGS:  Symmetric air entry.  A few wheezes here and there. 

HEART:  S1 and S2.  Regular rate. 

ABDOMEN:  Soft, not distended or tender.



LABORATORY DATA:  White cell count 5.6, hemoglobin 9.4.  That is from 26th.

Creatinine was 1.16, not repeated.  The chest x-ray did not show any infiltrates. 



ASSESSMENT AND DISCUSSION:  Autoimmune encephalitis history, on prednisone with

influenza A infection and then concerned for superimposed bacterial infection.  The

patient is hopefully improving now.  We will write to remove Anderson catheter tomorrow

for her to switch to in and out catheterization.  May be able to go home in 1 or 2

days on oral Levaquin and finish course of oseltamivir. 







Job ID:  [id number]

## 2020-01-28 NOTE — CON
DATE OF CONSULTATION:  



Patient's room #264.



PRIMARY CARE DOCTOR:  Dr. Samanta Cadet.



PRIMARY CARDIOLOGIST:  Dr. Domonique Holland.



PRIMARY INFECTIOUS DISEASE DOCTOR: Dr. Jackson. 



REASON FOR CONSULT:  Shortness of breath.



HISTORY OF PRESENT ILLNESS:  Mrs. Mullins is an 80-year-old female with 
significant

history of autoimmune encephalitis, rheumatoid arthritis, with chronic

corticosteroid and Orencia, prior TKR infection, multiple history of UTI, 
shingles,

sleep apnea, CKD, and hypertension.  According to caregiver, patient started 
having

shortness of breath, weakness, and patient started confused, and fibromyalgia.

Patient was transferred to emergency department.  Patient was found to have

influenza type A positive.  At this moment, patient is alert and oriented x4.  
She

is on oxygen supplement, but at home patient's O2 sat was 88.  Patient's heart 
rate

has been 90 to 110 at this moment.  Patient denied any chest pain, heaviness,

tightness, dizziness, lightheadedness, or any other cardiac complaints, except 
the

shortness of breath. 



Patient had a stress test done in April 2019 with no reversible ischemia with EF

65%.  Patient had echocardiograms done during this admission with EF more than 
60%

to 65%, suggestive of diastolic dysfunction, trace mitral valve regurgitation, 
mild

tricuspid regurgitation, and pulmonic regurgitation. 



MEDICAL HISTORY:  Frequent UTI, hypertension, hypothyroidism, venous 
insufficiency

with history of GSV stripping, neurogenic bladder with chronic in and out cath,

hyperlipidemia, rheumatoid arthritis, autoimmune encephalitis, history of left 
knee

surgery with infection, and recent shingles. 



SURGICAL HISTORY:  Cholecystectomy, hysterectomy, left knee surgery in June 2017
,

and right GSV ablation. 



FAMILY HISTORY:  Patient's father has a medical history of congestive heart 
failure

and the patient's brother has a cardiac stent placement. 



SOCIAL HISTORY:  She is a .  She is living by herself.  She has a 
caregiver at

the bedside, supportive family member.  She denied ETOH, tobacco, or illicit 
drug

abuse. 



ALLERGIES:  SHE IS ALLERGIC TO SULFA AND LEVAQUIN.



HOME MEDICATION:  

1. Levothyroxine 100 mcg once a day.

2. Lexapro 20 mg once a day.

3. Lasix 20 mg once a day.

4. Norvasc 5 mg once a day.

5. Cozaar 100 mg once a day.

6. Arava 20 mg once a day.

7. Tramadol with acetaminophen 1 tablet twice a day.

8. Prednisone 8 mg once a day.

9. Ferrous sulfate 125 __________ mg once a day.

10. Multivitamin 1 tablet once a day.

11. Cranberry tablet, two tablets daily as needed.

12. Vitamin D3, 1000 __________ once a day.

13. Orencia subcu.

14. Pepcid 40 mg once a day.

15. Gabapentin 100 mg at night.

16. MiraLAX 17 g once a day.



REVIEW OF SYSTEMS:  12-point review of systems negative unless otherwise 
mentioned

in the HPI.  Patient uses in and out cath for history of neurogenic bladder. 



PHYSICAL EXAMINATION:

VITAL SIGNS:  Blood pressure 116/56; temperature 99.4; pulse is 80s to 110s, 
sinus

rhythm, sinus tach; respiratory rate 16; and O2 saturation 96% with 2 L nasal

cannula. 

GENERAL:  Patient is alert and oriented x4, not in any acute distress. 

HEENT:  Normocephalic and atraumatic. 

EYES:  Extraocular muscle movement intact.  ENT and mouth, oral nasal mucosa 
moist

without lesion. 

NECK:  Supple.  Normal range of motion.  No JVD. 

RESPIRATORY:  Clear to auscultate bilaterally.  No wheezing, rales, or rhonchi

noted. 

CARDIOVASCULAR:  Regular rate and rhythm.  Normal S1, S2.  There is no S3 or 
S4.  No

significant murmur, hives, or thrills noted.  2+ pulses in bilateral upper and 
lower

extremities.  No edema in the lower extremities.  Carotid pulses are present 
without

bruit or thrill. 

ABDOMEN:  Soft, nontender.  No mass palpated.  Bowel sounds are present but

hypoactive. 

MUSCULOSKELETAL:  Patient able to move all extremities.  She is up to the chair

without any difficulty.  Patient denied claudication in the lower extremities. 

SKIN:  Warm and dry.  She has old bruise on bilateral forearms.  She says it is 
from

antibiotic. 

NEUROLOGIC:  Patient is alert and oriented x4 at this moment, not in acute 
distress.

 Nonfocal neurologic exam. 

PSYCHIATRIC:  Patient's mood is appropriate.



IMAGING:  Chest x-ray shows no acute findings.  The brain CT scan shows no 
evidence

of acute intracranial abnormality. 



LABORATORY DATA:  WBC 5.6, hemoglobin 9.4, hematocrit 29.1, and platelets 207.

Sodium 140, potassium 3.7, BUN 26, creatinine 1.16, and glucose 129.  Lactic 
acid is

1.4, AST 42, and ALT 26.  CK-MB 0.5.  Troponin 0.058, 0.101, and 0.097.  TSH 
0.4660.

 UA is negative.  Patient is positive for influenza A. 



ASSESSMENT/PLAN:  

1. Shortness of breath, possibly secondary to positive to influenza A or 
possible

acute on chronic diastolic heart failure.  Patient's condition is stable at this

moment with 2 L nasal cannula.  She is on antibiotic and breathing treatment 
which

is managed by primary care doctor. 

2. Acute on chronic diastolic heart failure.  Patient's condition is stable at 
this

moment with 2 L nasal cannula.  She is on Lasix 20 mg once a day.  Her output

yesterday was more than 3600.  She is on losartan 100 mg once a day and the

carvedilol is 3.125 twice a day, which will be restarted from this afternoon 
for heart

failure management and tachycardia. 

3. Tachycardia.  Has been in sinus rhythm and sinus tach.  The carvedilol 3.125

twice a day is going to be restarted. 

4. Hypertension. Amlodipine will be stopped and restart carvedilol 3.125

mg twice a day for heart failure management and tachycardia. 

5. Acute kidney injury on chronic kidney disease, stage 3.  Patient's kidney

function has been improving slowly. 

6. Anemia.  She has been on iron supplement at this point, which is managed by

primary care doctor. 

7. Sleep apnea.  According to the patient's caregiver, she wears CPAP at night 
3 to

5 hours.  We encouraged the patient to keep the CPAP on during the night when 
she

was sleepy. 

8. Hypothyroidism.  She is on levothyroxine, which is managed by primary care 
doctor.

9. Rheumatoid arthritis.  She is on chronic corticosteroids. 



Thank you very much for Cardiology Service to participate in the care of this

patient.  We will follow the patient's care team and make further 
recommendations as

appropriate. 







Job ID:  [id number]



[facility]

## 2020-01-28 NOTE — PDOC.HOSPP
- Subjective


Subjective: 





Seen and examined. Patient with generalized malaise from influenza. Fever of 

103.2 overnight. Chest x-ray normal. Infectious disease titrating antibiotics 

appropriately. Blood pressure uncontrolled, adjusting medications. Time was 

given for questions, all answered in detail.





- Objective


Vital Signs & Weight: 


 Vital Signs (12 hours)











  Temp Pulse Resp BP Pulse Ox


 


 01/28/20 11:20  99.4 F  110 H  16  116/56 L  96


 


 01/28/20 10:46   90  14  


 


 01/28/20 07:55  100.1 F H  102 H  15  138/72  94 L


 


 01/28/20 06:43   98  14  


 


 01/28/20 04:00  97.6 F  104 H  22 H  141/65 H  94 L








 Weight











Weight                         193 lb














I&O: 


 











 01/27/20 01/28/20 01/29/20





 06:59 06:59 06:59


 


Intake Total 960 1780 


 


Output Total 1000 3620 


 


Balance -40 -1840 











Result Diagrams: 


 01/26/20 04:18





 01/26/20 04:18


Radiology Reviewed by me: Yes





Hospitalist ROS





- Review of Systems


All other systems reviewed; all pertinent +/- noted in HPI/Subj





- Medication


Medications: 


Active Medications











Generic Name Dose Route Start Last Admin





  Trade Name Freq  PRN Reason Stop Dose Admin


 


Acetaminophen  650 mg  01/25/20 17:37  01/28/20 09:58





  Tylenol  PO   650 mg





  Q6H PRN   Administration





  PAIN 1-5   





     





     





     


 


Albuterol/Ipratropium  3 ml  01/25/20 19:00  01/28/20 10:46





  Duoneb  NEB   3 ml





  [id number] VIKKI   Administration





     





     





     





     


 


Amlodipine Besylate  5 mg  01/28/20 09:00  01/28/20 09:57





  Norvasc  PO   5 mg





  QAM VIKKI   Administration





     





     





     





     


 


Bisacodyl  10 mg  01/25/20 15:05  01/27/20 08:50





  Dulcolax  PO   10 mg





  DAILYPRN PRN   Administration





  Constipation   





     





     





     


 


Cholecalciferol  1,000 units  01/26/20 09:00  01/28/20 09:57





  Vitamin D3  PO   1,000 units





  DAILY VIKKI   Administration





     





     





     





     


 


Escitalopram Oxalate  20 mg  01/26/20 09:00  01/28/20 09:57





  Lexapro  PO   20 mg





  DAILY VIKKI   Administration





     





     





     





     


 


Famotidine  20 mg  01/26/20 21:00  01/27/20 20:16





  Pepcid  PO   20 mg





  2100 VIKKI   Administration





     





     





     





     


 


Ferrous Sulfate  325 mg  01/26/20 09:00  01/28/20 09:57





  Feosol  PO   325 mg





  DAILY VIKKI   Administration





     





     





     





     


 


Furosemide  20 mg  01/26/20 09:00  01/28/20 09:57





  Lasix  PO   20 mg





  DAILY VIKKI   Administration





     





     





     





     


 


Heparin Sodium (Porcine)  5,000 units  01/25/20 21:00  01/28/20 09:58





  Heparin  SC   5,000 units





  TID VIKKI   Administration





     





     





     





     


 


Cefepime HCl 2 gm/ Sodium  100 mls @ 200 mls/hr  01/27/20 17:00  01/27/20 17:52





  Chloride  IVPB   100 mls





  Q24HR VIKKI   Administration





     





     





     





     


 


Vancomycin HCl 1.25 gm/ Sodium  250 mls @ 166.667 mls/hr  01/27/20 18:00  01/27/ 20 17:53





  Chloride  IVPB   250 mls





  Q24HR VIKKI   Administration





     





     





     





     


 


Levothyroxine Sodium  100 mcg  01/26/20 06:00  01/28/20 05:13





  Synthroid  PO   100 mcg





  0600 VIKKI   Administration





     





     





     





     


 


Losartan Potassium  100 mg  01/26/20 09:00  01/28/20 09:58





  Cozaar  PO   100 mg





  DAILY VIKKI   Administration





     





     





     





     


 


Multivitamins  1 tab  01/26/20 09:00  01/28/20 09:58





  Theragran  PO   1 tab





  DAILY VIKKI   Administration





     





     





     





     


 


Oseltamivir Phosphate  75 mg  01/25/20 21:00  01/28/20 09:58





  Tamiflu  PO  01/30/20 09:01  75 mg





  BID VIKKI   Administration





     





     





     





     


 


Prednisone  7.5 mg  01/26/20 09:00  01/28/20 09:58





  Prednisone  PO   7.5 mg





  DAILY VIKKI   Administration





     





     





     





     


 


Tramadol HCl  100 mg  01/25/20 17:37  01/25/20 21:40





  Ultram  PO   100 mg





  TIDPRN PRN   Administration





  Pain 1-5   





     





     





     


 


Valacyclovir HCl  1,000 mg  01/25/20 21:00  01/28/20 09:58





  Valtrex  PO   1,000 mg





  BID VIKKI   Administration





     





     





     





     














- Exam


General Appearance: NAD, awake alert


Eye: anicteric sclera


ENT: normocephalic atraumatic, moist mucosa


Neck: symmetric, no lymphadenopathy


Heart: no murmur, no gallops, no rubs


Respiratory: no rales, no ronchi, wheezes (faint)


Gastrointestinal: soft, non-tender, no guarding, no rigidity


Extremities: no edema


Skin: no lesions, no rashes


Neurological: cranial nerve grossly intact, no focal deficits


Musculoskeletal: generalized weakness


Psychiatric: normal affect, A&O x 3





Hosp A/P


(1) Influenza A


Code(s): J10.1 - FLU DUE TO OTH IDENT INFLUENZA VIRUS W OTH RESP MANIFEST   

Status: Acute   





(2) Bronchitis


Code(s): J40 - BRONCHITIS, NOT SPECIFIED AS ACUTE OR CHRONIC   Status: Acute   





(3) Shortness of breath


Code(s): R06.02 - SHORTNESS OF BREATH   Status: Acute   





(4) Altered mental state


Code(s): R41.82 - ALTERED MENTAL STATUS, UNSPECIFIED   Status: Acute   





(5) Sepsis


Code(s): A41.9 - SEPSIS, UNSPECIFIED ORGANISM   Status: Acute   





(6) Physical deconditioning


Code(s): R53.81 - OTHER MALAISE   Status: Acute   





(7) Anxiety and depression


Code(s): F41.9 - ANXIETY DISORDER, UNSPECIFIED; F32.9 - MAJOR DEPRESSIVE 

DISORDER, SINGLE EPISODE, UNSPECIFIED   Status: Chronic   





(8) CKD (chronic kidney disease) stage 3, GFR 30-59 ml/min


Code(s): N18.3 - CHRONIC KIDNEY DISEASE, STAGE 3 (MODERATE)   Status: Chronic   





(9) GERD (gastroesophageal reflux disease)


Code(s): K21.9 - GASTRO-ESOPHAGEAL REFLUX DISEASE WITHOUT ESOPHAGITIS   Status: 

Chronic   





(10) HTN (hypertension)


Code(s): I10 - ESSENTIAL (PRIMARY) HYPERTENSION   Status: Chronic   





(11) Hypothyroid


Code(s): E03.9 - HYPOTHYROIDISM, UNSPECIFIED   Status: Chronic   





(12) Microcytic anemia


Code(s): D50.9 - IRON DEFICIENCY ANEMIA, UNSPECIFIED   Status: Chronic   





(13) HARISH (obstructive sleep apnea)


Code(s): G47.33 - OBSTRUCTIVE SLEEP APNEA (ADULT) (PEDIATRIC)   Status: Chronic

   





(14) Rheumatoid arthritis


Code(s): M06.9 - RHEUMATOID ARTHRITIS, UNSPECIFIED   Status: Chronic   





- Plan





Plan:


Medical unit with telemetry


Cardiology consultation, recommendations appreciated


infectious disease consultation, recommendations appreciated


echocardiogram shows preserved EF with Diastolic CHF


tachycardia with elevated troponins, likely demand ischemia


type II NSTEMI diagnosed on admission


BP not controlled, adjusting meds


Tamiflu for influenza positive


patient with wheezing and cough on admission likely early bronchitis


CXR negative for focal PNA


Empiric antibiotics per ID


continue chronic steroids


breathing treatments scheduled and as needed


patient with chronic neurogenic bladder who performed straight caths scheduled 

at baseline


urine analysis is negative for urinary tract infection


urine culture with E. coli, this is likely colonization rather than acute 

infection.


continue other home medications as able


blood pressure control next line blood sugar control


G.I. prophylaxis


DVT prophylaxis

## 2020-01-29 NOTE — CON
DATE OF CONSULTATION:  01/28/2020



ADDENDUM:  



INDICATION FOR CONSULTATION:  This is an 80-year-old female who has been followed by

me for many years now.  She had history of some shortness of breath in the past and

congestive heart failure with hypertension.  She has had no history that I recall

any other coronary artery disease or valvular disease.  She has had some problems

recently over the last several years, although immune problems with encephalitis and

rheumatoid arthritis.  She recently had some fevers, become somewhat confused and

was brought to the emergency room.  She appears to be somewhat better now.  She is

improved.  She was alert when I spoke to her today.  Cardiac status appears to be

stable.  Echocardiogram was within normal limits.  She had a normal ejection

fraction.  There was no indication of any intracardiac thrombi, masses or

vegetations.  Valvular structures appear to be relatively normal.  At this time,

there were no complaints of chest pain.  She did have some shortness of breath on

admission, which may be related to fevers that she had and viral syndrome but this

time she denies any cardiac complaints. 



PAST MEDICAL HISTORY:  Please refer to the notes dictated by nurse practitioner.



SOCIAL HISTORY:  Please refer to the notes dictated by nurse practitioner.



FAMILY HISTORY:  Please refer to the notes dictated by nurse practitioner.



REVIEW OF SYSTEMS:  Please refer to the notes dictated by nurse practitioner.



MEDICATIONS:  Please refer to the notes dictated by nurse practitioner.



ALLERGIES:  PLEASE REFER TO THE NOTES DICTATED BY NURSE PRACTITIONER.



PHYSICAL EXAMINATION:

GENERAL:  Reveals a well-developed, well-nourished, elderly female, who is in no

acute distress at this time.  She is alert.  She is oriented.  She is speaking

normally. 

VITAL SIGNS:  Show a blood pressure of 115/57.  She is afebrile.  Heart rate is in

the 90s and shows a sinus rhythm, respiratory rate is 20, O2 saturation is 95%. 

HEENT:  Unremarkable. 

CHEST:  Clear to auscultation.  I did not hear any rales, rhonchi, or wheezing. 

CARDIOVASCULAR:  Reveals a regular rate and rhythm.  She has normal S1, S2.  I

cannot hear an S3 nor an S4.  There were no significant murmurs, heaves, thrills,

bruits or rubs. 

ABDOMEN:  Shows obesity with positive bowel sounds. 

EXTREMITIES:  She may have had mild pedal edema.  There was no other clubbing or

cyanosis.  Pedal pulses are present. 

NEUROLOGICAL:  She appears to be intact at this time.



IMPRESSION:  

1. Elderly female with acute viral illnesses with elevated temperature and confusion

for which she presented to the emergency room.  This appears to have improved with

medical management.  She has been seen by Dr. Jackson.  At this time, we will continue

the present medications in regard to her autoimmune problems. 

2. History of hypertension, this is under good control at this time.

3. History of congestive heart failure in the past, which I believe most likely was

diastolic in nature.  At this time, she appears to be stable.  We will be more than

happy to continue to follow the patient with you but at this time overall from a

cardiac standpoint, she is stable. 







Job ID:  [id number]

## 2020-01-29 NOTE — PDOC.CPN
- Subjective


Date: 01/29/20


Time: 13:16


Interval history: 





The pt seen and examined.  No overnight events.  No cardiac complaints.  





- Objective


Allergies/Adverse Reactions: 


 Allergies











Allergy/AdvReac Type Severity Reaction Status Date / Time


 


levofloxacin [From Levaquin] Allergy Intermediate Rash Verified 09/05/19 23:04


 


Sulfa (Sulfonamide Allergy Intermediate Rash Verified 09/05/19 23:04





Antibiotics)     











Visit Medications: 


 Current Medications





Acetaminophen (Tylenol)  650 mg PO Q6H PRN


   PRN Reason: PAIN 1-5


   Last Admin: 01/28/20 09:58 Dose:  650 mg


Hydrocodone Bitart/Acetaminophen (Norco 5/325)  1 tab PO Q4H PRN


   PRN Reason: Moderate to Severe Pain (6-10)


Albuterol/Ipratropium (Duoneb)  3 ml NEB [id number]


   Last Admin: 01/29/20 10:59 Dose:  3 ml


Bisacodyl (Dulcolax)  10 mg PO DAILYPRN PRN


   PRN Reason: Constipation


   Last Admin: 01/27/20 08:50 Dose:  10 mg


Calcium Carbonate (Tums)  1,000 mg PO Q4H PRN


   PRN Reason: Heartburn  or Indigestion


Carvedilol (Coreg)  3.125 mg PO BID-[facility]


   Last Admin: 01/29/20 09:42 Dose:  3.125 mg


Cholecalciferol (Vitamin D3)  1,000 units PO DAILY [facility]


   Last Admin: 01/29/20 09:42 Dose:  1,000 units


Escitalopram Oxalate (Lexapro)  20 mg PO DAILY [facility]


   Last Admin: 01/29/20 09:42 Dose:  20 mg


Famotidine (Pepcid)  20 mg PO 2100 [facility]


   Last Admin: 01/28/20 20:23 Dose:  20 mg


Ferrous Sulfate (Feosol)  325 mg PO DAILY [facility]


   Last Admin: 01/29/20 09:42 Dose:  325 mg


Furosemide (Lasix)  20 mg PO DAILY [facility]


   Last Admin: 01/29/20 09:42 Dose:  20 mg


Heparin Sodium (Porcine) (Heparin)  5,000 units SC TID [facility]


   Last Admin: 01/29/20 09:42 Dose:  5,000 units


Cefepime HCl 2 gm/ Sodium (Chloride)  100 mls @ 200 mls/hr IVPB Q24HR [facility]


   Last Admin: 01/28/20 17:29 Dose:  100 mls


Vancomycin HCl 1.25 gm/ Sodium (Chloride)  250 mls @ 166.667 mls/hr IVPB Q24HR 

[facility]


   Last Admin: 01/28/20 17:30 Dose:  250 mls


Labetalol HCl (Normodyne)  10 mg SLOW IVP Q4H PRN


   PRN Reason: SBP Greater Than 180


Levothyroxine Sodium (Synthroid)  100 mcg PO 0600 [facility]


   Last Admin: 01/29/20 05:13 Dose:  100 mcg


Loperamide HCl (Imodium)  2 mg PO PRN PRN


   PRN Reason: Diarrhea/Loose Stools


Losartan Potassium (Cozaar)  100 mg PO DAILY [facility]


   Last Admin: 01/29/20 09:42 Dose:  100 mg


Miscellaneous Medication (Pharmacy To Dose)  1 each IVPB PRN PRN


   PRN Reason: Pharmacy to dose


Multivitamins (Theragran)  1 tab PO DAILY [facility]


   Last Admin: 01/29/20 09:42 Dose:  1 tab


Ondansetron HCl (Zofran Odt)  4 mg PO Q6H PRN


   PRN Reason: Nausea/Vomiting


Ondansetron HCl (Zofran)  4 mg IVP Q6H PRN


   PRN Reason: Nausea/Vomiting


Oseltamivir Phosphate (Tamiflu)  75 mg PO BID [facility]


   Stop: 01/30/20 09:01


   Last Admin: 01/29/20 09:42 Dose:  75 mg


Prednisone (Prednisone)  8 mg PO DAILY [facility]


Prednisone (Prednisone)  0.5 mg PO NOW [facility]


   Stop: 01/29/20 13:15


   Last Admin: 01/29/20 12:03 Dose:  0.5 mg


Tramadol HCl (Ultram)  100 mg PO TIDPRN PRN


   PRN Reason: Pain 1-5


   Last Admin: 01/25/20 21:40 Dose:  100 mg


Valacyclovir HCl (Valtrex)  1,000 mg PO BID [facility]


   Last Admin: 01/29/20 09:43 Dose:  1,000 mg








Vital Signs & Weight: 


 Vital Signs











  Temp Pulse Resp BP Pulse Ox


 


 01/29/20 11:38  98.1 F  85  16  136/63  96


 


 01/29/20 10:59   92  20  


 


 01/29/20 07:27   74  20   97


 


 01/29/20 07:25  97.8 F  74  16  145/72 H  96


 


 01/29/20 03:26  98 F  84  20  136/64  96








 











Weight                         193 lb

















- Physical Exam


General: alert & oriented x3


HEENT: mucus membranes moist


Cardiac: regular rate and rhythm, S1/S2


Lungs: decreased breath sounds





- Labs


Result Diagrams: 


 01/29/20 18:29





 01/29/20 06:44


 Troponin/CKMB











CK-MB (CK-2)  0.5 ng/mL (0-6.6)   01/25/20  10:47    


 


Troponin I  0.097 ng/mL (< 0.028)  H  01/25/20  16:37    














- Telemetry


Sinus rhythms and dysrhythmias: sinus rhythm





- Assessment/Plan


Assessment/Plan: 





1. Influenza A/Bronchitis - stable with NC; 


2. Acute on chronic diastolic HF - stable with Lasix and Coreg; not on ACE/ARB 

due to hx of CKD


3. Sepsis 


4. HTN - 


5. CKD stage 3 - 


6. Anemia


7. Sleep apnea  


8. Hypothyroidism. 


9. Rheumatoid arthritis - on chronic corticosteroids


MAR reviewed





* Echo on 01/27/2020 with EF > 60-65%, grade I dd, trace MR, mild TR and VA





Pt. seen and eval. by me.I agree with the A/P by the NP. she is coherent. 

Diffuse expiratory wheezing. She has seen Dr. Valencia in the past and she is 

questioning if he should evaluate her this admission.. From a cardiac 

standpoint she is stable enough to move to a medical floor.

## 2020-01-29 NOTE — PDOC.HOSPP
- Subjective


Encounter Date: 01/29/20


Encounter Time: 16:30


Subjective: 





Patient seen and examined for Sepsis. Feels gen weak. Cough with mild 

production. No CP/SOB. No new complaints. No overnight events





- Objective


Vital Signs & Weight: 


 Vital Signs (12 hours)











  Temp Pulse Resp BP Pulse Ox


 


 01/29/20 15:10  98.8 F  96  18  145/66 H  95


 


 01/29/20 14:49   93  16  


 


 01/29/20 11:38  98.1 F  85  16  136/63  96


 


 01/29/20 10:59   92  20  


 


 01/29/20 07:27   74  20   97


 


 01/29/20 07:25  97.8 F  74  16  145/72 H  96








 Weight











Weight                         193 lb














I&O: 


 











 01/28/20 01/29/20 01/30/20





 06:59 06:59 06:59


 


Intake Total 1780 1160 


 


Output Total 3620 1850 


 


Balance -6640 690 











Result Diagrams: 


 01/26/20 04:18





 01/29/20 06:44


EKG Reviewed by me: Yes (Tele SR)





Hospitalist ROS





- Review of Systems


Cardiovascular: denies: chest pain, palpitations, orthopnea, paroxysmal noc. 

dyspnea, edema, light headedness, other


Gastrointestinal: denies: nausea, vomiting, abdominal pain, diarrhea, 

constipation, melena, hematochezia, other





- Medication


Medications: 


Active Medications











Generic Name Dose Route Start Last Admin





  Trade Name Freq  PRN Reason Stop Dose Admin


 


Acetaminophen  650 mg  01/25/20 17:37  01/28/20 09:58





  Tylenol  PO   650 mg





  Q6H PRN   Administration





  PAIN 1-5   





     





     





     


 


Albuterol/Ipratropium  3 ml  01/25/20 19:00  01/29/20 14:49





  Duoneb  NEB   3 ml





  [id number] VIKKI   Administration





     





     





     





     


 


Bisacodyl  10 mg  01/25/20 15:05  01/27/20 08:50





  Dulcolax  PO   10 mg





  DAILYPRN PRN   Administration





  Constipation   





     





     





     


 


Carvedilol  3.125 mg  01/28/20 17:00  01/29/20 17:51





  Coreg  PO   3.125 mg





  BID- VIKKI   Administration





     





     





     





     


 


Cholecalciferol  1,000 units  01/26/20 09:00  01/29/20 09:42





  Vitamin D3  PO   1,000 units





  DAILY VIKKI   Administration





     





     





     





     


 


Escitalopram Oxalate  20 mg  01/26/20 09:00  01/29/20 09:42





  Lexapro  PO   20 mg





  DAILY VIKKI   Administration





     





     





     





     


 


Famotidine  20 mg  01/26/20 21:00  01/28/20 20:23





  Pepcid  PO   20 mg





  2100 VIKKI   Administration





     





     





     





     


 


Ferrous Sulfate  325 mg  01/26/20 09:00  01/29/20 09:42





  Feosol  PO   325 mg





  DAILY VIKKI   Administration





     





     





     





     


 


Furosemide  20 mg  01/26/20 09:00  01/29/20 09:42





  Lasix  PO   20 mg





  DAILY VIKKI   Administration





     





     





     





     


 


Heparin Sodium (Porcine)  5,000 units  01/25/20 21:00  01/29/20 15:10





  Heparin  SC   5,000 units





  TID VIKKI   Administration





     





     





     





     


 


Cefepime HCl 2 gm/ Sodium  100 mls @ 200 mls/hr  01/27/20 17:00  01/29/20 17:51





  Chloride  IVPB   100 mls





  Q24HR VIKKI   Administration





     





     





     





     


 


Levothyroxine Sodium  100 mcg  01/26/20 06:00  01/29/20 05:13





  Synthroid  PO   100 mcg





  0600 VIKKI   Administration





     





     





     





     


 


Losartan Potassium  100 mg  01/26/20 09:00  01/29/20 09:42





  Cozaar  PO   100 mg





  DAILY VIKKI   Administration





     





     





     





     


 


Multivitamins  1 tab  01/26/20 09:00  01/29/20 09:42





  Theragran  PO   1 tab





  DAILY VIKKI   Administration





     





     





     





     


 


Oseltamivir Phosphate  75 mg  01/25/20 21:00  01/29/20 09:42





  Tamiflu  PO  01/30/20 09:01  75 mg





  BID VIKKI   Administration





     





     





     





     


 


Tramadol HCl  100 mg  01/25/20 17:37  01/25/20 21:40





  Ultram  PO   100 mg





  TIDPRN PRN   Administration





  Pain 1-5   





     





     





     


 


Valacyclovir HCl  1,000 mg  01/25/20 21:00  01/29/20 09:43





  Valtrex  PO   1,000 mg





  BID VIKKI   Administration





     





     





     





     














- Exam


General Appearance: NAD


Heart: RRR, no gallops


Respiratory: no wheezes, no rales, no ronchi


Gastrointestinal: non-tender, non-distended, normal bowel sounds


Extremities: no cyanosis, no clubbing





Hosp A/P





- Plan


DVT proph w/SCDs





Sepsis due to Influenza A/E coli UTI


SHARLENE on CKD 3


Acute on chronic diastolic HF


Type 2 MI - POA - resolved


RA - on chronic steroids


HTN


HARISH


Obesity BMI 34.2





PLAN:


Cont Tamiflu


Cont IV Vancomycin/Cefepime


Monitor Vancomycin level


Cont fluid restriction


Cont Prednisone


Cont other meds as above


Transfer to medical if ok with Cardio

## 2020-01-30 NOTE — PDOC.CPN
- Subjective


Date: 01/30/20


Time: 08:00


Interval history: 





The pt seen and examined.  No overnight events.  No cardiac complaints. 





- Objective


Allergies/Adverse Reactions: 


 Allergies











Allergy/AdvReac Type Severity Reaction Status Date / Time


 


levofloxacin [From Levaquin] Allergy Intermediate Rash Verified 09/05/19 23:04


 


Sulfa (Sulfonamide Allergy Intermediate Rash Verified 09/05/19 23:04





Antibiotics)     











Visit Medications: 


 Current Medications





Acetaminophen (Tylenol)  650 mg PO Q6H PRN


   PRN Reason: PAIN 1-5


   Last Admin: 01/28/20 09:58 Dose:  650 mg


Hydrocodone Bitart/Acetaminophen (Norco 5/325)  1 tab PO Q4H PRN


   PRN Reason: Moderate to Severe Pain (6-10)


Albuterol/Ipratropium (Duoneb)  3 ml NEB [id number] PRN


   PRN Reason: SOB &/or Wheezing


Amlodipine Besylate (Norvasc)  2.5 mg PO ONE [facility]


Amlodipine Besylate (Norvasc)  2.5 mg PO DAILY [facility]


Bisacodyl (Dulcolax)  10 mg PO DAILYPRN PRN


   PRN Reason: Constipation


   Last Admin: 01/27/20 08:50 Dose:  10 mg


Calcium Carbonate (Tums)  1,000 mg PO Q4H PRN


   PRN Reason: Heartburn  or Indigestion


Carvedilol (Coreg)  3.125 mg PO BID-[facility]


   Last Admin: 01/30/20 08:44 Dose:  3.125 mg


Cholecalciferol (Vitamin D3)  1,000 units PO DAILY [facility]


   Last Admin: 01/30/20 08:43 Dose:  1,000 units


Escitalopram Oxalate (Lexapro)  20 mg PO DAILY [facility]


   Last Admin: 01/30/20 08:45 Dose:  20 mg


Famotidine (Pepcid)  20 mg PO 2100 [facility]


   Last Admin: 01/29/20 21:15 Dose:  20 mg


Ferrous Sulfate (Feosol)  325 mg PO DAILY [facility]


   Last Admin: 01/30/20 08:44 Dose:  325 mg


Furosemide (Lasix)  20 mg PO DAILY [facility]


   Last Admin: 01/30/20 08:44 Dose:  20 mg


Heparin Sodium (Porcine) (Heparin)  5,000 units SC BID [facility]


Cefepime HCl 2 gm/ Sodium (Chloride)  100 mls @ 200 mls/hr IVPB Q24HR [facility]


   Last Admin: 01/29/20 17:51 Dose:  100 mls


Vancomycin HCl 1.5 gm/ Device  300 mls @ 200 mls/hr IVPB Q24HR@1800 [facility]


   Last Admin: 01/29/20 19:15 Dose:  300 mls


Labetalol HCl (Normodyne)  10 mg SLOW IVP Q4H PRN


   PRN Reason: SBP Greater Than 180


Levothyroxine Sodium (Synthroid)  100 mcg PO 0600 [facility]


   Last Admin: 01/30/20 05:31 Dose:  100 mcg


Loperamide HCl (Imodium)  2 mg PO PRN PRN


   PRN Reason: Diarrhea/Loose Stools


Losartan Potassium (Cozaar)  100 mg PO DAILY [facility]


   Last Admin: 01/30/20 08:43 Dose:  100 mg


Miscellaneous Medication (Pharmacy To Dose)  1 each IVPB PRN PRN


   PRN Reason: Pharmacy to dose


Multivitamins (Theragran)  1 tab PO DAILY [facility]


   Last Admin: 01/30/20 08:44 Dose:  1 tab


Ondansetron HCl (Zofran Odt)  4 mg PO Q6H PRN


   PRN Reason: Nausea/Vomiting


Ondansetron HCl (Zofran)  4 mg IVP Q6H PRN


   PRN Reason: Nausea/Vomiting


Prednisone (Prednisone)  40 mg PO QAM-WM [facility]


   Stop: 02/04/20 08:01


Sodium Chloride (Flush - Normal Saline)  10 ml IVF Q12HR [facility]


Sodium Chloride (Flush - Normal Saline)  10 ml IVF PRN PRN


   PRN Reason: Saline Flush


Tramadol HCl (Ultram)  100 mg PO TIDPRN PRN


   PRN Reason: Pain 1-5


   Last Admin: 01/25/20 21:40 Dose:  100 mg


Valacyclovir HCl (Valtrex)  1,000 mg PO BID [facility]


   Last Admin: 01/30/20 08:44 Dose:  1,000 mg








Vital Signs & Weight: 


 Vital Signs











  Temp Pulse Resp BP Pulse Ox


 


 01/30/20 11:39  97.8 F  81  18  168/83 H  93 L


 


 01/30/20 10:44     148/74 H 


 


 01/30/20 08:00      92 L


 


 01/30/20 07:52  98.7 F  75  20  194/84 H  93 L


 


 01/30/20 07:19      93 L


 


 01/30/20 07:16   72  16   93 L


 


 01/30/20 04:00  98.2 F  78  22 H  144/70 H  96








 











Weight                         193 lb

















- Physical Exam


General: alert & oriented x3


HEENT: mucus membranes moist


Neck: supple neck


Cardiac: regular rate and rhythm, S1/S2


Lungs: clear to auscultation


Neuro: cranial nerve 2-12 intact


Extremities: no edema





- Labs


Result Diagrams: 


 01/29/20 18:29





 01/29/20 06:44


 Troponin/CKMB











CK-MB (CK-2)  0.5 ng/mL (0-6.6)   01/25/20  10:47    


 


Troponin I  0.097 ng/mL (< 0.028)  H  01/25/20  16:37    














- Telemetry


Sinus rhythms and dysrhythmias: sinus rhythm





- Assessment/Plan


Assessment/Plan: 





1. Influenza A/Bronchitis - stable with RA; 


2. Acute on chronic diastolic HF - stable with Lasix and Coreg; not on ACE/ARB 

due to hx of CKD


3. Sepsis 


4. HTN - will resume Norvasc 2.5mg qd from today


5. CKD stage 3 - 


6. Anemia


7. Sleep apnea  


8. Hypothyroidism. 


9. Rheumatoid arthritis - on chronic corticosteroids


MAR reviewed





* Echo on 01/27/2020 with EF > 60-65%, grade I dd, trace MR, mild TR and SD


* From a cardiac standpoint she is stable enough to move to a medical floor.





pt. seen and eval. by me. i agree with the A/P by the NP. Cardiac status is 

stable. Chest: few expiratory wheezes. RRR.  I will sign off. gray

## 2020-01-30 NOTE — PRG
PULMONARY CONSULT NOTE:





DATE OF SERVICE:  01/30/2020



SERVICE:  Pulmonary Medicine.



INTERVAL HISTORY:  The patient is doing really well from respiratory standpoint.

She indicates that her breathing is basically close to baseline at this point.  
She

is not having any fevers or chills.  Otherwise, there has been no interval 
change to

her condition.  Because she is resistant to the Levaquin, she is going to stay 
in

the hospital until she completes her antibiotics for her urine culture.  That 
being

said, there has been no interval change to her condition. 



PAST MEDICAL HISTORY:  

1. Hypertension.

2. Dyslipidemia.

3. Obstructive sleep apnea.

4. Gastroesophageal reflux disease.

5. Rheumatoid arthritis.

6. Chronic diastolic heart failure.



PAST SURGICAL HISTORY:  None.



SOCIAL HISTORY:  Negative for alcohol, tobacco, or illicit drug use.  She has no

exposure to chemicals, dust, asbestos, or tuberculosis. 



FAMILY HISTORY:  Noncontributory.



ALLERGIES:  SULFA, LEVOFLOXACIN.



MEDICATIONS:  List of her inpatient medications were reviewed.  No specific 
updates

were made. 



REVIEW OF SYSTEMS:  General, head, ears, eyes, nose, throat, cardiovascular,

respiratory, GI, , musculoskeletal, neurologic, and skin is negative except as

mentioned in the HPI. 



PHYSICAL EXAMINATION:

VITAL SIGNS:  Afebrile, pulse 81, blood pressure 168/83, respirations 18, and

saturation 93% on room air. 

GENERAL:  The patient is awake and alert, in no apparent distress. 

LUNGS:  Decent air entry.  Slightly prolonged expiratory phase.  A little bit of

wheezing is present.  Rhonchi are also noted. 

HEART:  Normal rate.  Regular. 

ABDOMEN:  Soft, nontender, and nondistended.  Bowel sounds are positive. 

MUSCULOSKELETAL:  No cyanosis or clubbing.  There is no pitting in bilateral 
lower

extremities. 



LABORATORY DATA:  WBC 5.1, hemoglobin 9.6, and platelets 193,000.  Basic 
metabolic

profile is essentially unremarkable except for creatinine of 1.14, which is

basically at baseline.  Troponin is gently downtrending to 0.97.  Urinalysis and

vancomycin are unremarkable.  E coli is growing in the urine, which is a fairly

sensitive species.  Blood cultures x2 are negative.  Influenza A is positive. 



ASSESSMENT:  

1. Acute hypoxic respiratory failure, resolved.

2. Chronic diastolic heart failure.

3. Acute bronchitis secondary to influenza A.

4. Urinary tract infection.



DISCUSSION AND PLAN:  The patient is doing fantastic from a respiratory 
standpoint.

We will involve Physical Therapy.  She has not seen in the last 2 days.  As such
, I

will discontinue the consult and reestablish it.  Critical Care will continue to

follow very closely while the patient remains in the hospital.  From my 
perspective,

she will be stable for discharge from the hospital once her course of 
antibiotics

are completed. 



70 minutes have been devoted to this patient in various activities.  I 
personally reviewed all imaging studies and laboratory data noted within this 
document.  For fifty percent of this time, I was interacting with the patient 
at the bedside or coordinating care with the care team.  For the remainder of 
the time I was immediately available to the patient in the hospital unit.  



Job ID:  [id number]



MTDD

## 2020-01-30 NOTE — PDOC.HOSPP
- Subjective


Encounter Date: 01/30/20


Encounter Time: 12:00


Subjective: 





Patient seen and examined for Sepsis. SOB/Wheezing +. Mild productive cough. No 

other complaints. No overnight events





- Objective


Vital Signs & Weight: 


 Vital Signs (12 hours)











  Temp Pulse Resp BP Pulse Ox


 


 01/30/20 11:39  97.8 F  81  18  168/83 H  93 L


 


 01/30/20 10:44     148/74 H 


 


 01/30/20 08:00      92 L


 


 01/30/20 07:52  98.7 F  75  20  194/84 H  93 L


 


 01/30/20 07:19      93 L


 


 01/30/20 07:16   72  16   93 L


 


 01/30/20 04:00  98.2 F  78  22 H  144/70 H  96








 Weight











Weight                         193 lb














I&O: 


 











 01/29/20 01/30/20 01/31/20





 06:59 06:59 06:59


 


Intake Total 1160 1320 


 


Output Total 1850 1850 


 


Balance -690 -530 











Result Diagrams: 


 01/29/20 18:29





 01/29/20 06:44


EKG Reviewed by me: Yes (Tele SR)





Hospitalist ROS





- Review of Systems


Cardiovascular: denies: chest pain, palpitations, orthopnea, paroxysmal noc. 

dyspnea, edema, light headedness, other


Gastrointestinal: denies: nausea, vomiting, abdominal pain, diarrhea, 

constipation, melena, hematochezia, other





- Medication


Medications: 


Active Medications











Generic Name Dose Route Start Last Admin





  Trade Name Freq  PRN Reason Stop Dose Admin


 


Acetaminophen  650 mg  01/25/20 17:37  01/28/20 09:58





  Tylenol  PO   650 mg





  Q6H PRN   Administration





  PAIN 1-5   





     





     





     


 


Albuterol/Ipratropium  3 ml  01/25/20 19:00  01/30/20 10:51





  Duoneb  NEB   Not Given





  [id number] VIKKI   





     





     





     





     


 


Bisacodyl  10 mg  01/25/20 15:05  01/27/20 08:50





  Dulcolax  PO   10 mg





  DAILYPRN PRN   Administration





  Constipation   





     





     





     


 


Carvedilol  3.125 mg  01/28/20 17:00  01/30/20 08:44





  Coreg  PO   3.125 mg





  BID-WM VIKKI   Administration





     





     





     





     


 


Cholecalciferol  1,000 units  01/26/20 09:00  01/30/20 08:43





  Vitamin D3  PO   1,000 units





  DAILY VIKKI   Administration





     





     





     





     


 


Escitalopram Oxalate  20 mg  01/26/20 09:00  01/30/20 08:45





  Lexapro  PO   20 mg





  DAILY VIKKI   Administration





     





     





     





     


 


Famotidine  20 mg  01/26/20 21:00  01/29/20 21:15





  Pepcid  PO   20 mg





  2100 VIKKI   Administration





     





     





     





     


 


Ferrous Sulfate  325 mg  01/26/20 09:00  01/30/20 08:44





  Feosol  PO   325 mg





  DAILY VIKKI   Administration





     





     





     





     


 


Furosemide  20 mg  01/26/20 09:00  01/30/20 08:44





  Lasix  PO   20 mg





  DAILY VIKKI   Administration





     





     





     





     


 


Cefepime HCl 2 gm/ Sodium  100 mls @ 200 mls/hr  01/27/20 17:00  01/29/20 17:51





  Chloride  IVPB   100 mls





  Q24HR VIKKI   Administration





     





     





     





     


 


Vancomycin HCl 1.5 gm/ Device  300 mls @ 200 mls/hr  01/29/20 18:00  01/29/20 19

:15





  IVPB   300 mls





  Q24HR@1800 VIKKI   Administration





     





     





     





     


 


Levothyroxine Sodium  100 mcg  01/26/20 06:00  01/30/20 05:31





  Synthroid  PO   100 mcg





  0600 VIKKI   Administration





     





     





     





     


 


Losartan Potassium  100 mg  01/26/20 09:00  01/30/20 08:43





  Cozaar  PO   100 mg





  DAILY VIKKI   Administration





     





     





     





     


 


Multivitamins  1 tab  01/26/20 09:00  01/30/20 08:44





  Theragran  PO   1 tab





  DAILY VIKKI   Administration





     





     





     





     


 


Tramadol HCl  100 mg  01/25/20 17:37  01/25/20 21:40





  Ultram  PO   100 mg





  TIDPRN PRN   Administration





  Pain 1-5   





     





     





     


 


Valacyclovir HCl  1,000 mg  01/25/20 21:00  01/30/20 08:44





  Valtrex  PO   1,000 mg





  BID VIKKI   Administration





     





     





     





     














- Exam


General Appearance: NAD


Heart: RRR, no gallops


Respiratory: no rales, no ronchi, wheezes


Gastrointestinal: non-tender, non-distended, normal bowel sounds


Extremities: no cyanosis, no clubbing





Hosp A/P





- Plan


DVT proph w/SCDs





Sepsis due to Influenza A/E coli UTI


SHARLENE on CKD 3


Acute on chronic diastolic HF


Type 2 MI - POA - resolved


RA - on chronic steroids


HTN


HARISH


Obesity BMI 34.2


Hypothyroidism





PLAN:


Completed Tamiflu


Cont Vancomycin/Cefepime


Cont Vancomycin level monitoring


Cont PO Lasix/fluid restriction


Cont Prednisone 40 mg x 5 days per Dr Valencia


Cont other meds as above


Transfer to medical


AM labs


Change Nebs to PRN per patient req

## 2020-01-31 NOTE — PDOC.HOSPP
- Subjective


Encounter Date: 01/31/20


Encounter Time: 09:15


Subjective: 





Patient seen and examined for Sepsis. SOB and wheezing improving. No fever. No 

new complaints. No overnight events





- Objective


Vital Signs & Weight: 


 Vital Signs (12 hours)











  Temp Pulse Resp BP Pulse Ox


 


 01/31/20 19:39  99.1 F  83  20  165/61 H  93 L








 Weight











Weight                         193 lb














I&O: 


 











 01/30/20 01/31/20 02/01/20





 06:59 06:59 06:59


 


Intake Total 1320  1050


 


Output Total 1850  


 


Balance -530  1050











Result Diagrams: 


 01/31/20 05:20





 01/31/20 05:20





Hospitalist ROS





- Review of Systems


Respiratory: reports: cough, dry.  denies: shortness of breath, hemoptysis, SOB 

with excertion, pleuritic pain, sputum, wheezing, other


Cardiovascular: denies: chest pain, palpitations, orthopnea, paroxysmal noc. 

dyspnea, edema, light headedness, other


Gastrointestinal: denies: nausea, vomiting, abdominal pain, diarrhea, 

constipation, melena, hematochezia, other





- Medication


Medications: 


Active Medications











Generic Name Dose Route Start Last Admin





  Trade Name Freq  PRN Reason Stop Dose Admin


 


Acetaminophen  650 mg  01/25/20 17:37  01/28/20 09:58





  Tylenol  PO   650 mg





  Q6H PRN   Administration





  PAIN 1-5   





     





     





     


 


Amlodipine Besylate  2.5 mg  01/31/20 09:00  01/31/20 08:33





  Norvasc  PO   2.5 mg





  DAILY VIKKI   Administration





     





     





     





     


 


Bisacodyl  10 mg  01/25/20 15:05  01/27/20 08:50





  Dulcolax  PO   10 mg





  DAILYPRN PRN   Administration





  Constipation   





     





     





     


 


Carvedilol  3.125 mg  01/28/20 17:00  01/31/20 17:12





  Coreg  PO   3.125 mg





  BID-WM VIKKI   Administration





     





     





     





     


 


Cholecalciferol  1,000 units  01/26/20 09:00  01/31/20 08:34





  Vitamin D3  PO   1,000 units





  DAILY VIKKI   Administration





     





     





     





     


 


Escitalopram Oxalate  20 mg  01/26/20 09:00  01/31/20 08:34





  Lexapro  PO   20 mg





  DAILY VIKKI   Administration





     





     





     





     


 


Famotidine  20 mg  01/26/20 21:00  01/30/20 16:50





  Pepcid  PO   20 mg





  2100 VIKKI   Administration





     





     





     





     


 


Ferrous Sulfate  325 mg  01/26/20 09:00  01/31/20 08:34





  Feosol  PO   325 mg





  DAILY VIKKI   Administration





     





     





     





     


 


Furosemide  20 mg  01/26/20 09:00  01/31/20 08:34





  Lasix  PO   20 mg





  DAILY VIKKI   Administration





     





     





     





     


 


Heparin Sodium (Porcine)  5,000 units  01/30/20 21:00  01/31/20 08:34





  Heparin  SC   5,000 units





  BID VIKKI   Administration





     





     





     





     


 


Cefepime HCl 2 gm/ Sodium  100 mls @ 200 mls/hr  01/27/20 17:00  01/30/20 16:50





  Chloride  IVPB   100 mls





  Q24HR VIKKI   Administration





     





     





     





     


 


Vancomycin HCl 1.5 gm/ Device  300 mls @ 200 mls/hr  01/29/20 18:00  01/31/20 17

:13





  IVPB   300 mls





  Q24HR@1800 VIKKI   Administration





     





     





     





     


 


Levothyroxine Sodium  100 mcg  01/26/20 06:00  01/31/20 05:48





  Synthroid  PO   100 mcg





  0600 VIKKI   Administration





     





     





     





     


 


Losartan Potassium  100 mg  01/26/20 09:00  01/31/20 08:34





  Cozaar  PO   100 mg





  DAILY VIKKI   Administration





     





     





     





     


 


Multivitamins  1 tab  01/26/20 09:00  01/31/20 08:35





  Theragran  PO   1 tab





  DAILY VIKKI   Administration





     





     





     





     


 


Prednisone  40 mg  01/31/20 08:00  01/31/20 08:33





  Prednisone  PO  02/04/20 08:01  40 mg





  QAM-WM VIKKI   Administration





     





     





     





     


 


Sodium Chloride  10 ml  01/30/20 21:00  01/31/20 08:35





  Flush - Normal Saline  IVF   10 ml





  Q12HR VIKKI   Administration





     





     





     





     


 


Tramadol HCl  100 mg  01/25/20 17:37  01/25/20 21:40





  Ultram  PO   100 mg





  TIDPRN PRN   Administration





  Pain 1-5   





     





     





     


 


Valacyclovir HCl  1,000 mg  01/25/20 21:00  01/31/20 08:35





  Valtrex  PO   1,000 mg





  BID VIKKI   Administration





     





     





     





     














- Exam


General Appearance: NAD


Heart: no gallops, no rubs


Respiratory: no wheezes, no rales, rhonchi


Gastrointestinal: soft, non-tender, non-distended, normal bowel sounds


Extremities: no cyanosis


Neurological: no new deficit





Hosp A/P





- Plan


DVT proph w/heparin





Sepsis due to Influenza A/E coli UTI


SHARLENE on CKD 3


Hypomagnesemia


Acute on chronic diastolic HF


Type 2 MI - POA - resolved


RA - on chronic steroids


HTN


HARISH


Obesity BMI 34.2


Hypothyroidism





PLAN:


Cont Vancomycin/Cefepime for 1 more day - Will check with Dr Jackson in AM if she 

needs more Atbx (Please see Dr Anne's note) - Pt allergic to Levaquin


Cont Prednisone 40 mg x 5 days per Dr Valencia - then resume home dose


Cont other meds as above


Magnesium replacement


Completed Tamiflu


DC in 24-48 hr if stable

## 2020-01-31 NOTE — PRG
DATE OF SERVICE:  01/31/2020



SERVICE:  Pulmonary Medicine.



INTERVAL HISTORY:  The patient is doing really well from respiratory standpoint.

Breathing comfortably.  No complaints of chest discomfort, fevers, or chills.

Otherwise, her breathing is comfortable.  Every day she moves in the right

direction.  She is little concerned because she is still wheezing.  Otherwise, 
there

has been no interval change to her condition. 



PHYSICAL EXAMINATION:

VITAL SIGNS:  Afebrile.  Pulse 81, blood pressure 136/83, respirations 18,

saturation 95%, currently on room air. 

GENERAL:  The patient is awake and alert, in no apparent distress. 

LUNGS:  Wonderful air entry.  There is minimally prolonged expiratory phase.

Wheezing is improved.  Rhonchi are present and they clear with cough. 

HEART:  Normal rate, regular. 

ABDOMEN:  Soft, nontender, nondistended.  Bowel sounds are positive. 

MUSCULOSKELETAL:  No cyanosis or clubbing.  No pitting in the bilateral lower

extremities. 

NEUROLOGIC:  Grossly nonfocal.



LABORATORY DATA:  WBC 3.1, hemoglobin 9.5, and platelets 152,000.  Monocytes 
are 15%

and it rebounded beautifully.  Creatinine 1.25.  Basic metabolic profile is

otherwise unremarkable.  Magnesium 1.7.  Vancomycin trough 12.5.  E coli is 
growing

in the urine.  Influenza A is positive with the blood cultures remain negative 
x2. 



ASSESSMENT:  

1. Acute hypoxic respiratory failure, resolved.

2. Acute bronchitis secondary to influenza A.

3. Urinary tract infection secondary to Escherichia coli.

4. Chronic diastolic heart failure, currently euvolemic.

5. Obstructive sleep apnea.  



DISCUSSION AND PLAN:  I will give her dose of magnesium.  From a purely 
respiratory

standpoint, she is stable for transition out of the hospital.  Pulmonary will 

continue to follow, intermittently during the hospital stay.  If she gets into

trouble through the weekend, please notify me.  When she is ready to transition

home, she can complete a 5-day course of prednisone and then resume her home

prednisone.  I would very much like for her to use her CPAP if she remains 
inhouse. 







Job ID:  [id number]



MTDD

## 2020-02-01 NOTE — PDOC.HOSPP
- Subjective


Encounter Date: 02/01/20


Encounter Time: 12:30


Subjective: 


Patient without SOB or chest pain. Ambulating well without dizziness or 

weakness.





- Objective


Vital Signs & Weight: 


 Vital Signs (12 hours)











  Temp Pulse Resp BP BP BP Pulse Ox


 


 02/01/20 09:05   91   133/74   


 


 02/01/20 07:22  98.3 F  91  17   133/74   93 L


 


 02/01/20 04:00  98.4 F  70  20    162/68 H  94 L


 


 01/31/20 23:58  98.2 F  83  20    147/77 H  96








 Weight











Weight                         193 lb














I&O: 


 











 01/31/20 02/01/20 02/02/20





 06:59 06:59 06:59


 


Intake Total  1290 


 


Balance  1290 











Result Diagrams: 


 01/31/20 05:20





 01/31/20 05:20





Hospitalist ROS





- Review of Systems


Constitutional: denies: fever, chills


Respiratory: denies: cough, shortness of breath


Cardiovascular: denies: chest pain, palpitations


Gastrointestinal: denies: nausea, vomiting, abdominal pain





- Medication


Medications: 


Active Medications











Generic Name Dose Route Start Last Admin





  Trade Name Freq  PRN Reason Stop Dose Admin


 


Acetaminophen  650 mg  01/25/20 17:37  01/28/20 09:58





  Tylenol  PO   650 mg





  Q6H PRN   Administration





  PAIN 1-5   





     





     





     


 


Amlodipine Besylate  2.5 mg  01/31/20 09:00  02/01/20 09:05





  Norvasc  PO   2.5 mg





  DAILY VIKKI   Administration





     





     





     





     


 


Bisacodyl  10 mg  01/25/20 15:05  01/27/20 08:50





  Dulcolax  PO   10 mg





  DAILYPRN PRN   Administration





  Constipation   





     





     





     


 


Carvedilol  3.125 mg  01/28/20 17:00  02/01/20 09:04





  Coreg  PO   3.125 mg





  BID-WM VIKKI   Administration





     





     





     





     


 


Cholecalciferol  1,000 units  01/26/20 09:00  02/01/20 09:07





  Vitamin D3  PO   1,000 units





  DAILY VIKKI   Administration





     





     





     





     


 


Escitalopram Oxalate  20 mg  01/26/20 09:00  02/01/20 09:07





  Lexapro  PO   20 mg





  DAILY VIKKI   Administration





     





     





     





     


 


Famotidine  20 mg  01/26/20 21:00  01/31/20 21:48





  Pepcid  PO   20 mg





  2100 VIKKI   Administration





     





     





     





     


 


Ferrous Sulfate  325 mg  01/26/20 09:00  02/01/20 09:04





  Feosol  PO   325 mg





  DAILY VIKKI   Administration





     





     





     





     


 


Furosemide  20 mg  01/26/20 09:00  02/01/20 09:03





  Lasix  PO   20 mg





  DAILY VIKKI   Administration





     





     





     





     


 


Heparin Sodium (Porcine)  5,000 units  01/30/20 21:00  02/01/20 09:07





  Heparin  SC   5,000 units





  BID VIKKI   Administration





     





     





     





     


 


Cefepime HCl 2 gm/ Sodium  100 mls @ 200 mls/hr  01/27/20 17:00  01/30/20 16:50





  Chloride  IVPB   100 mls





  Q24HR VIKKI   Administration





     





     





     





     


 


Vancomycin HCl 1.5 gm/ Device  300 mls @ 200 mls/hr  01/29/20 18:00  01/31/20 17

:13





  IVPB   300 mls





  Q24HR@1800 VIKKI   Administration





     





     





     





     


 


Levothyroxine Sodium  100 mcg  01/26/20 06:00  02/01/20 06:18





  Synthroid  PO   100 mcg





  0600 VIKKI   Administration





     





     





     





     


 


Losartan Potassium  100 mg  01/26/20 09:00  01/31/20 08:34





  Cozaar  PO   100 mg





  DAILY VIKKI   Administration





     





     





     





     


 


Multivitamins  1 tab  01/26/20 09:00  02/01/20 09:04





  Theragran  PO   1 tab





  DAILY VIKKI   Administration





     





     





     





     


 


Prednisone  40 mg  01/31/20 08:00  02/01/20 09:04





  Prednisone  PO  02/04/20 08:01  40 mg





  QAM-WM VIKKI   Administration





     





     





     





     


 


Sodium Chloride  10 ml  01/30/20 21:00  02/01/20 09:08





  Flush - Normal Saline  IVF   10 ml





  Q12HR VIKKI   Administration





     





     





     





     


 


Tramadol HCl  100 mg  01/25/20 17:37  01/25/20 21:40





  Ultram  PO   100 mg





  TIDPRN PRN   Administration





  Pain 1-5   





     





     





     


 


Valacyclovir HCl  1,000 mg  01/25/20 21:00  02/01/20 09:08





  Valtrex  PO   1,000 mg





  BID VIKKI   Administration





     





     





     





     














- Exam


General Appearance: NAD, awake alert


ENT: moist mucosa


Heart: RRR, no murmur, no gallops, no rubs


Respiratory: no rales, no ronchi


Respiratory - other findings: rare wheeze, good air movement throughout, no 

increased WOB


Gastrointestinal: soft, non-tender, non-distended, normal bowel sounds


Psychiatric: normal affect, normal behavior, A&O x 3





Hosp A/P


(1) Sepsis


Code(s): A41.9 - SEPSIS, UNSPECIFIED ORGANISM   Status: Acute   





(2) Influenza A


Code(s): J10.1 - FLU DUE TO OTH IDENT INFLUENZA VIRUS W OTH RESP MANIFEST   

Status: Acute   





(3) UTI (urinary tract infection)


Status: Acute   





- Plan





DVT proph w/heparin





Sepsis due to Influenza A/E coli UTI


SHARLENE on CKD 3


Hypomagnesemia


Acute on chronic diastolic HF


Type 2 MI - POA - resolved


RA - on chronic steroids


HTN


HARISH


Obesity BMI 34.2


Hypothyroidism





PLAN:


switch to doxycycline for 5 more days


Cont Prednisone 40 mg x 5 days per Dr Valencia - then resume home dose


Cont other meds as above


Magnesium replacement


Completed Tamiflu


DC home today, home [facility] for home PT

## 2020-02-03 NOTE — DIS
DATE OF ADMISSION:  01/25/2020



DATE OF DISCHARGE:  02/01/2020



PRIMARY CARE PHYSICIAN:  Samanta Cadet MD



REASON FOR ADMISSION:  Influenza A with sepsis and altered mental status.



DIAGNOSES AT DISCHARGE:  

1. Sepsis, resolved.

2. Influenza A infection.

3. Escherichia coli urinary tract infection.

4. Bacterial superinfection of influenza.

5. Rheumatoid arthritis, on disease modifying agents.

6. Acute on chronic diastolic congestive heart failure.

7. Type 2 non-ST-elevation myocardial infarction present on admission, resolved.

8. Hypomagnesemia, resolved.

9. Hypertension.

10. Obesity.

11. Obstructive sleep apnea.

12. Hypothyroidism.



PROCEDURES:  

1. CT of the brain showing no evidence for acute intracranial abnormality.

2. Echocardiogram showing ejection fraction of 60% to 65%. and some diastolic

dysfunction. 



CONSULTATIONS:  

1. Infectious Disease, Dr. Jackson.

2. Cardiology, Dr. Holland.

3. Pulmonology, Dr. Valencia.



SUMMARY OF HOSPITAL COURSE:  This is an 80-year-old white female with a known

history of rheumatoid arthritis, on disease modifying agents, also with diastolic

congestive heart failure, hypertension, and chronic renal failure stage 3.  She came

in with fever and confusion, found to be influenza A positive and was treated for

this.  The patient had persistent fevers.  Dr. Jackson was consulted.  The patient was

put on broad-spectrum antibiotics for bacterial superinfection.  She was also noted

to have urinary tract infection with E coli.  She did have some mildly elevated

troponins to 0.1.  Dr. Holland was consulted and adjusted her medications.  She was

diuresed well and then was back to her baseline fluid status.  Dr. Valencia was also

consulted.  He did put her on some steroids for 5 days.  The patient was breathing

much better.  She was ambulating in the hospital.  She has had no further altered

mental status and no hypoxia and she is being cleared for discharge. 



DISCHARGE MANAGEMENT:  Discharged home with Home Health for physical therapy.



ACTIVITY:  As tolerated.



DIET:  Healthy heart low-sodium diet.



FOLLOWUP:  Follow up with Dr. Jackson in 2 to 3 weeks and with Dr. Cadet in 7 days.



DISCHARGE MEDICATIONS:  

1. Doxycycline 100 mg twice a day 10 caps dispensed.

2. Prednisone 20 mg tablets, two tablets daily for three more days and then go back

to her home dose of prednisone 8 mg daily. 

3. Carvedilol 3.125 mg twice a day 60 tabs dispensed.

4. Vitamin D3 5000 units daily.

5. Lexapro 20 mg daily.

6. Ferrous sulfate   125 mg daily.

7. Furosemide 20 mg daily.

8. Levothyroxine 100 mcg daily.

9. Cozaar 100 mg daily.

10. Multivitamin daily.

11. Valacyclovir 1000 mg twice a day.

12. Orencia 250 mg IVPB monthly.

13. Amlodipine 5 mg daily.

14. Cranberry tablets twice a day as needed.

15. Famotidine 40 mg daily.

16. Gabapentin 100 mg at night.

17. Leflunomide 20 mg daily.

18. MiraLAX 17 g daily.

19. Tramadol as needed. 



Arranging details of this discharge took 32 minutes.







Job ID:  [id number]

## 2020-03-30 NOTE — CON
DATE OF CONSULTATION:  03/30/2020



REASON FOR CONSULTATION:  Nausea and vomiting.



HISTORY OF PRESENT ILLNESS:  Ms. Mullins is an 80-year-old female who was 
admitted

from the ER with recurrent nausea and vomiting that has been worsening over the 
last

one week.  Her symptoms first started approximately 7 to 8 weeks ago after 
discharge

from the hospital for influenza infection.  At that time, she has had sporadic 
bouts

of nausea, vomiting on a random basis.  Over the last 1 to 1.5 week, she has had

more recurrent episodes especially 30 to 40 minutes after eating.  She denies 
any

localizing abdominal pain or discomfort.  Her appetite has not been 
significantly

decreased, when in fact, she reports feeling very hungry at the present time. 
She has 

lost approximately 10pounds. 

There is no fever or chills.  The patient was in the ER 4 days ago and was 
discharged

to home on Keflex for presumptive cystitis.  Approximately 1 week ago, her

leflunomide was discontinued; however, the nausea and vomiting did not improve.
  The

patient has had chronic GE reflux characterized as mostly regurgitant symptoms

especially at night.  A recent upper GI study performed in January of this year

showed a small hiatal hernia with large amount of reflux, presbyesophagus, and

delayed emptying of contrast from the stomach, although some contrast was 
visualized

in the small bowel at 17 minutes.  Chest, abdomen, and pelvic CT without 
contrast

performed earlier today in the ER, did not show any acute findings.  She has had

chronic constipation with a small bowel movement yesterday. 



PAST MEDICAL HISTORY:  

1. Hypertension.

2. Rheumatoid arthritis.

3. Neurogenic bladder with in-and-out catheterization.

4. GE reflux disease.

5. Chronic kidney disease.

6. Presumptive nondiabetic gastroparesis based on upper GI series finding 3 
months

ago. 

7. Obstructive sleep apnea.



PAST SURGICAL HISTORY:  Include; knee replacement on the left, hysterectomy, and

cholecystectomy. 



SOCIAL HISTORY:  The patient lives by herself at home, but does have 24-hour 
care.

She has no tobacco or alcohol usage. 



FAMILY HISTORY:  Negative for any known GI problem, liver disease, or GI 
malignancy.



ALLERGIES:  INCLUDE LEVAQUIN AND SULFA.



MEDICATION:  At home include;

1. Lexapro.

2. Lasix.

3. Neurontin.

4. Synthroid.

5. Losartan.

6. MiraLAX.

7. Prednisone 8 mg daily.

8. Ultracet q.6 p.r.n. pain.

9. Iron sulfate, and other supplements.



REVIEW OF SYSTEMS:  Ten-point review of systems did not show any other pertinent

positives or negatives, no other reported symptoms other than aforementioned. 



PHYSICAL EXAMINATION:

VITAL SIGNS:  Temperature is 98.4, blood pressure 123/77, and pulse of 100. 

GENERAL:  She is alert, conversant, does not appear in any distress. 

HEENT:  Exam shows anicteric sclerae.  Oropharynx shows good dentition, moist

mucosa. 

NECK:  Supple.  No mass or thyromegaly. 

CV:  Shows normal S1 and S2.  Regular rate and rhythm. 

CHEST:  Shows breath sounds clear to auscultation. 

ABDOMEN:  Soft, mildly protuberant, no distention.  No tympany.  There is no

tenderness.  She has active bowel sounds diffusely. 

EXTREMITIES:  No edema.



LABORATORY DATA:  Sodium 136, potassium 3.1, CO2 of 28, creatinine 1.91, and 
BUN 24.

 LFTs are normal.  WBCs 8.3, hemoglobin 10.6, and platelet count 281. 



Chest, abdominal, and pelvic CT without contrast was normal except for liver 
cysts

that was seen on last year's CT. 



ASSESSMENT:  

1. Recurrent nausea and vomiting over the last 8 weeks that started as random 
event,

now more recurrent after eating.  She did have evidence of delayed emptying of 
the

contrast in the stomach from her upper gastrointestinal study 3 months ago, 
which

led to presumptive diagnosis of nondiabetic gastroparesis, however, I suspect 
this

finding is very nonspecific.  At the present time, there is certainly no

radiographic or clinical finding to suggest obstruction either small bowel or

gastric outlet.  Differential diagnosis of her recurrent nausea and vomiting 
could

include polypharmacy, nondiabetic delayed and gastric emptying, and less likely 
a

gastric outlet obstruction. 

2. Evidence of dehydration and hypokalemia.

3. Renal insufficiency, could be exacerbated by severely reduced oral intake.

4. Chronic constipation.



PLAN:  

1. The patient has been started on scheduled IV Reglan 10 mg q.8, we will 
observe to

see how she does with this. 

2. Agree with checking cortisol level in a.m.

3. Continue ondansetron 4 mg IV q.6 hours as needed.

4. Since the patient has been started on GoLYTELY for constipation, we will see 
how

she can tolerate this. 

5. We will start the patient on low-dose mirtazapine 7.5 mg to help with her 
symptoms as this medication

   has an antiemetic effect. 

6. We will restart on famotidine 40 mg p.o. b.i.d.

7. No indication for endoscopy or other tests at the present time.

8. We will advance diet as tolerated.

9. Further recommendation to follow pending her clinical course.







Job ID:  [id number]



MTDD

## 2020-03-30 NOTE — HP
REASON FOR ADMISSION:  Acute kidney injury, moderate dehydration, intractable nausea

and vomiting. 



HISTORY OF PRESENTING ILLNESS:  The patient gives history of having severe nausea,

vomiting, and has not been able to keep anything down from last 2 days now.  She has

had some nausea and loss of appetite for almost 4 to 5 weeks now.  She has lost

nearly 11-1/2 pounds in the last 5 weeks.  On Thursday, she almost passed out and

was brought to Emergency Room and was given Keflex for her urinary tract infection.

Her gastroenterologist is Dr. Cotter and has had a followup as well with her

recently.  She was given prescription for Reglan for suspected gastroparesis.  Ms. Mullins is also having a small dry stool despite being on bowel regimen.  She was

taken off leflunomide and the prednisone is also getting tapered and is currently on

8 mg for rheumatoid arthritis.  Despite all the above measures to help her improve

with her nausea, the patient has progressed to the point of moderate dehydration at

present.  She has been very lethargic and is sleeping more as well at home.  No

complaints of abdominal pain, chest pain or palpitations. 



PAST MEDICAL AND SURGICAL HISTORY:  History of chronic kidney disease stage 3,

follows up with Dr. Wiley De La Rosa.  Autoimmune encephalitis, history of rheumatoid

arthritis, osteonecrosis of left knee with total knee replacement and postop

infection with revision, neurogenic bladder with in and out catheters every 4 hours,

prior history of invasive UTIs, GERD, hysterectomy, cholecystectomy, obstructive

sleep apnea, noncompliance with CPAP and vein ligation. 



PERSONAL HISTORY:  Does not abuse alcohol or drugs.  No history of smoking.  She

lives at home and has 24/7 help. 



FAMILY HISTORY:  Father had history of heart disease and diabetes.  Mother had

history of diabetes and hypertension. 



CODE STATUS:  Full.  Power of  is her daughter, Ms. Fox, number to reach

her is [phone]. 



ALLERGIES:  SULFA AND LEVAQUIN.



CURRENT MEDICATIONS:  The patient is on;

1. Lexapro 20 mg daily.

2. Pepcid 40 mg twice daily.

3. Ferrous sulfate 125 mg daily.

4. Lasix 20 mg daily.

5. Neurontin 100 mg p.o. q.p.m.

6. Krill oil 500 mg p.o. daily.

7. Probiotic one capsule daily.

8. Synthroid 100 mcg daily.

9. Losartan 50 mg p.o. daily.

10. Zofran 4 mg p.o. q.6 hourly p.r.n.

11. MiraLAX 17 g daily.

12. Prednisone 8 mg daily.

13. Metamucil 0.4 g p.o. Daily.

14. Ultracet one tablet p.o. q.6 hourly p.r.n.

15. Vitamin D3 of 5000 units p.o. daily.

16. Keflex 500 mg p.o. twice daily, which was started on Thursday for urinary tract

infection. 



REVIEW OF SYSTEMS:  CONSTITUTIONAL:  Negative for weight loss or gain, ability to

conduct usual activities. 

SKIN:  Negative for rash, itching. 

EYES:  Negative for double vision, pain. 

ENT/MOUTH:  Negative for nose bleeding, neck stiffness, pain, tenderness. 

CARDIOVASCULAR:  Negative for palpitations, dyspnea on exertion, orthopnea. 

RESPIRATORY:  Negative for shortness of breath, wheezing, cough, hemoptysis, fever

or night sweats. 

GASTROINTESTINAL:  Negative for poor appetite, abdominal pain, heartburn, nausea,

vomiting, constipation, or diarrhea. 

GENITOURINARY:  Negative for urgency, frequency, dysuria, nocturia. 

MUSCULOSKELETAL:  Negative for pain, swelling. 

NEUROLOGIC/PSYCHIATRIC:  Negative for anxiety, depression. 

ALLERGY/IMMUNOLOGIC:  Negative for skin rash, bleeding tendency.



PHYSICAL EXAMINATION:

GENERAL:  The patient is an 80-year-old female, who is currently not in any acute

distress. 

VITAL SIGNS:  Blood pressure 124/76, pulse 100 per minute, respiratory rate 18 per

minute, temperature 98.4 degrees Fahrenheit, saturating 92% on room air. 

NECK:  Supple.  No elevated JVD. 

HEENT:  Eyes; extraocular muscles are intact.  Pupils reacting to light.  Oral

cavity, mucous membranes are dry.  No exudates or congestion. 

CARDIOVASCULAR SYSTEM:  S1 and S2, heard.  Regular rhythm. 

RESPIRATORY SYSTEM:  Air entry 1+ bilateral.  No rales or rhonchi. 

ABDOMEN:  Soft.  Bowel sounds heard.  No tenderness, rigidity, or guarding. 

EXTREMITIES:  No peripheral edema or calf tenderness. 

VASCULAR SYSTEM:  Peripheral pulses 1+ bilateral.  No ischemic ulcerations or

gangrene. 

CENTRAL NERVOUS SYSTEM:  No gross focal deficits noted.  The patient is alert,

awake, oriented well. 

PSYCHIATRIC SYSTEM:  The patient's mood is euthymic.  No hallucinations or delusions.



LABORATORY DATA:  CT of the chest, abdomen, and pelvis without contrast done showed

no evidence of pneumonia.  There is a 2.7 cm cyst in the dome of the liver.  No

calculus is seen in the kidneys or ureter or the urinary bladder.  No high-grade

bowel obstruction is seen.  White count of 8.3, H and H of 10 and 31, platelet count

281, MCV is 80, with 66% neutrophils.  PT/INR 14 and 1.1, PTT 21, potassium 3.1,

serum bicarb 28, BUN 24, creatinine 1.9, serum glucose 137.  Liver enzymes within

normal limits.  Albumin 3.6.  BNP is 43. 



CLINICAL IMPRESSION AND PLAN:  The patient will be under observation on medical

floor for acute kidney injury, moderate dehydration, nearly 11.5 pounds of weight

loss in the last 5 weeks with ongoing nausea.  Ms. Mullins will be gently hydrated

with normal saline at 100 mL per hour.  She will be given a total of 2 L.  We will

also give GoLYTELY 1 L to clear up her constipation.  The patient's baseline

creatinine is around 1.3 to 1.6, currently it is around 1.9.  We will continue her

Norvasc, Coreg, Lexapro, gabapentin, prednisone at 8 mg daily, Senokot, Ultram as

before.  She will be on Reglan 10 mg IV q.8 hourly to help with her nausea.  We will

obtain free T3, T4, TSH and cortisol levels in the morning.  We will obtain

consultation with Dr. Pyle who is on- 

call for Gastroenterology.  For tonight, the patient will be on clear liquid diet

and will advance as tolerated.  I have given complete updates to the patient and her

daughter, 

Ms. Fox.  If the patient does not recover by tomorrow, she will be switched over

to inpatient status. 







Job ID:  [id number]

## 2020-03-30 NOTE — RAD
XR Chest 1 View Portable



HISTORY: Cough, fever



COMPARISON: 1/25/2020



FINDINGS: The heart size is normal. There is continued elevation the right hemidiaphragm. The aorta i
s tortuous. The lungs are without focal areas of consolidation, pneumothorax or pleural effusions.



IMPRESSION: No radiographic evidence of acute cardiopulmonary process.



Reported By: Ramsey Teague 

Electronically Signed:  3/30/2020 12:57 PM

## 2020-03-30 NOTE — CT
CT CHEST WITHOUT CONTRAST

CT ABDOMEN WITHOUT CONTRAST 

CT PELVIS WITHOUT CONTRAST:

 

Date:  03/30/2020

 

HISTORY:  

Intractable nausea and vomiting, abdominal pain, shortness of breath. 

 

COMPARISON:  

None. 

 

FINDINGS:

Absence of oral and IV contrast reduce the sensitivity of exam, particularly for evaluation of medias
tinal, hilar, vascular structures, solid organs, and bowel. 

 

There are vascular calcifications without evidence of aneurysmal dilatation of the thoracoabdominal a
celso. There are degenerative changes in the thoracolumbar spine. There is minimal anterolisthesis of 
L4 over L5. 

 

No pleural or pericardial effusions are seen. The lungs are clear. 

 

No free air or free fluid is seen in the abdomen or pelvis. There is a 2.7 cm cyst in the dome of the
 liver. No calculi seen in the kidneys, ureters, or the urinary bladder. No hydroureteronephrosis is 
seen on either side. The small bowel loops are not abnormally dilated. 

 

IMPRESSION: 

1.  No evidence of pneumonia. 

2.  Liver cyst. 

3.  No CT evidence of urinary tract calculus/obstruction. 

4.  No evidence of high grade bowel obstruction. 

 

 

POS: [facility]

## 2020-03-31 NOTE — DIS
DATE OF ADMISSION:  03/30/2020



DATE OF DISCHARGE:  03/31/2020



DISCHARGE DISPOSITION:  To home.



PRIMARY DISCHARGE DIAGNOSES:  Acute kidney injury, intractable nausea and vomiting,

moderate dehydration, possible gastroparesis. 



SECONDARY DISCHARGE DIAGNOSES:  History of autoimmune encephalitis; history of

rheumatoid arthritis, on steroids; neurogenic bladder; gastroesophageal reflux

disease. 



PROCEDURES DONE DURING HOSPITALIZATION:  CT chest, abdomen, and pelvis without

contrast done showed no evidence of pneumonia.  There is 2.7 cm cyst in the dome of

the liver.  No urinary tract calculus or obstruction was seen.  No high-grade bowel

obstruction was seen.  H and H 10 and 30, platelet count 256, MCV is 80.  Discharge

BUN and creatinine are 19 and 1.53, free T4 of 1.1, free T3 of 2.49, TSH 0.64,

cortisol 6.6.  Admitting BUN and creatinine were 24 and 1.9, serum bicarb on the day

of discharge is 27, albumin is 3.6.  BNP 43. 



DISCHARGE MEDICATIONS:  

1. Reglan 10 mg twice daily.

2. Mirtazapine 7.5 mg p.o. q.h.s.

3. Ultracet q.6 hourly p.r.n.

4. Metamucil 0.4 g p.o. daily.

5. Prednisone 8 mg p.o. daily.

6. MiraLAX 17 g daily.

7. Zofran p.r.n.

8. Losartan 50 mg p.o. daily.

9. Synthroid 100 mcg p.o. daily.

10. Krill oil 500 mg p.o. daily.

11. Gabapentin 100 mg p.o. q.p.m.

12. Lasix 20 mg daily.

13. Ferrous sulfate 125 mg p.o. daily.

14. Pepcid 40 mg twice daily.

15. Lexapro 20 mg daily.

16. Vitamin D3 of 5000 units p.o. daily.

17. The patient to continue Keflex 500 mg p.o. twice daily for her recent urinary

tract infection and to finish the course as before. 



ALLERGIES:  LEVAQUIN AND SULFA.



INPATIENT CONSULT:  Dr. Pyle for Gastroenterology.



DISCHARGE PLAN:  The patient to follow up with Dr. Cadet in 1 week.  The patient

will have a teleconference with Dr. Pyle or Dr. Cotter in the upcoming week. 



BRIEF COURSE DURING HOSPITALIZATION:  The patient initially got admitted after

having severe nausea and vomiting and unable to keep anything down for last 2 to 3

days.  She also had moderate dehydration with acute kidney injury on top of chronic

kidney disease, stage 3.  She was gently hydrated with IV fluids.  She was placed on

Reglan a.c. and h.s..  She has had a CT of the chest, abdomen, and pelvis, which did

not reveal any acute pathology including bowel obstruction.  She had mild acute

kidney injury, which was corrected with gentle hydration, and she is at her baseline

CKD stage 3.  The patient had constipation and received 1 L of GoLYTELY, which has

cleared up some hard stool.  She needs to continue a bowel regimen as before.  The

patient was placed on mirtazapine and Reglan for discharge plan.  She has been

cleared by Dr. Pyle for discharge.  The patient has ambulated a bit with physical

therapy and rolling walker in the hospital.  I have given complete updates to the

patient and her daughter in the room.  She is otherwise hemodynamically stable and

will be shortly discharged home.  Please note I have seen and examined the patient

on the day of discharge. 







Job ID:  [id number]

## 2020-06-30 NOTE — ULT
BILATERAL RENAL ULTRASOUND:

 

Date:  06/30/2020

 

INDICATION:

Neurogenic bladder. Chronic kidney disease. 

 

FINDINGS:

Both kidneys measure approximately 12.0 cm in length. Small right renal cyst measures approximately 2
.0 cm. No hydronephrosis. Mild increased cortical echogenicity; however, the cortical thickness appea
rs normally preserved. 

 

The urinary bladder is distended and there is mild bladder wall thickening. 

 

IMPRESSION: 

Mild increased cortical echogenicity. Small right renal cyst. Distended urinary bladder. 

 

POS: [facility]

## 2020-08-05 NOTE — BD
EXAM: DEXA bone density examination



HISTORY: 81-year-old postmenopausal female for screening



COMPARISON: None



FINDINGS:

L1--bone mineral density 1.206 g/sq cm; T score 2.0

L2--bone mineral density 1.193 g/sq cm; T score 1.5

L3--bone mineral density 1.284 g/sq cm; T score 1.8

L4--bone mineral density 1.304 g/sq cm; T score 2.2

Total L1-L4--bone mineral density 1.252 g/sq cm; T score 1.9



Left femoral neck--bone mineral density0.765; T score -0.8

Total proximal left femur--bone mineral density 1.049; T score 0.9



IMPRESSION: Normal bone density. 



Reported By: Ben Mccloud 

Electronically Signed:  8/5/2020 11:42 AM

## 2020-12-17 NOTE — ULT
EXAM:

US Breast Limited Lt



PROVIDED CLINICAL HISTORY:

Focal pain



COMPARISON:

Screening mammogram 11/2/2020



FINDINGS:

Limited sonographic interrogation was performed of the left breast area, in the region of 9:00 10 cm 
from the nipple. This is a location of reported focal pain. The sonographic appearance of the soft

tissues in this region is normal.



IMPRESSION:

No sonographic abnormality is evident to explain the patient's pain. Negative imaging findings should
 not preclude further evaluation of a clinically suspicious abnormality. The patient is referred

back to her clinician.



Reported By: Braxton Hyman 

Electronically Signed:  12/17/2020 11:12 AM

## 2021-02-13 NOTE — CT
Exam: Abdomen CT without contrast

Pelvic CT without contrast



HISTORY: Nausea and vomiting. Weakness.



COMPARISON: 3/21/2019, 3/30/2020



FINDINGS:

Abdomen CT:

Lung bases:Patchy interstitial and groundglass opacities. Correlate for edema

Heart size: Normal heart size. No significant pericardial fluid

Aorta: Atherosclerosis without aneurysm

Solid organs: Limited evaluation by the absence of intravenous contrast. Stable hypodensity in the he
patic dome. Grossly no solid organ abnormality.

Lymph nodes: No gastrohepatic, retrocrural or periportal lymphadenopathy

Gallbladder: Surgically absent

Mesentery: No mass, lymphadenopathy, free air or free fluid

Kidneys: Obstructing 1 mm calculus in the left kidney. Bilaterally symmetric perinephric fat strandin
g. No evidence of obstructive uropathy.

Alimentary canal: Limited evaluation by lack of oral contrast. Small hiatal hernia. No small bowel ob
struction. Normal caliber appendix. Decompressed colon with scattered fecal material.





CT PELVIS: 

No mass, adenopathy, free air or free fluid.  Surgically absent uterus

Urinary bladder: Distended bladder. Encourage spontaneous voiding.

Osseous structures: No lytic or blastic lesions in the osseous structures. Grade 1 anterolisthesis of
 L4 upon L5, redemonstrated



IMPRESSION:



1. No acute abnormality in the abdomen or pelvis.



Reported By: Jamil Ni 

Electronically Signed:  2/13/2021 9:38 PM

## 2021-02-13 NOTE — PDOC.HHP
Hospitalist HPI


General Weakness, malaise


History of Present Illness: 





PCP: Dr. Cadet





The patient is an 81-year-old female with a past medical history significant for

HTN, HLD, HARISH, hypothyroidism, rheumatoid arthritis, autoimmune 

encephalitis/meningitis and neurogenic bladder that presents to the emergency 

department via EMS for the above complaint.  The patient reports receiving her 

second dose of the coronavirus vaccine yesterday.  Afterwards, the patient began

to feel ill throughout the evening.  She reports feeling nauseated.  She had one

episode of bile his vomitus.  She denies abdominal pain and diarrhea.  She did 

have a small BM the following morning.  She was recently started on lactulose 

for constipation the week prior.  She was so weak, that her 24-hour caregiver 

felt that she might fall when she stood up.  She denies fever chills and body 

aches.  No known sick contacts, stating that she has been staying home.  No loss

of taste or smell.  Denies any chest pain, heart palpitations, shortness of 

breath.  No history of DVT/PE.  No history of COPD/asthma.  Patient has a 

history of neurogenic bladder, she self caths daily.  She denies any dysuria or 

hematuria.  EMS was called and the patient was taken to the hospital for further

valuation








ED Course: 





BP: 157/81, Pulse: 98, Resp: 18, Temp: 97.8 (Oral), Pain: 0, O2 sat: 94 on (Room

Air), Time: 2/13/2021 18:54.


EKG normal sinus rhythm, T wave inversions in leads aVL, V4, V5, V6


Chest x-ray no acute cardiopulmonary process


Initial troponin 0.109


CT abdomen pelvis was negative for any acute abdominal process


WBCs 14.6


Glucose 172


BUN 24, creatinine 1.66








Medications:


aspirin oral 324 mg Oral Given 21:08 2/13/2021 


sodium chloride 0.9 % intravenous 1000 mL IV Fluid Infusion Given 19:26 

2/13/2021


Allergies/Adverse Reactions: 


                                        











Allergy/AdvReac Type Severity Reaction Status Date / Time


 


levofloxacin [From Levaquin] Allergy Intermediate Rash Verified 02/13/21 23:53


 


Sulfa (Sulfonamide Allergy Intermediate Rash Verified 02/13/21 23:53





Antibiotics)     











Home Medications: 


                                        











 Medication  Instructions  Recorded  Confirmed  Type


 


Escitalopram Oxalate [Lexapro] 20 mg PO DAILY 11/20/13 03/30/20 History


 


Levothyroxine Sodium [Synthroid] 100 mcg PO DAILY 11/20/13 03/30/20 History


 


Furosemide 20 mg PO DAILY 06/21/17 03/30/20 History


 


Losartan Potassium [Cozaar] 50 mg PO DAILY 06/06/19 03/30/20 History


 


predniSONE [Prednisone] 8 mg PO DAILY 06/06/19 03/30/20 History


 


traMADol HCl/Acetaminophen 1 each PO Q6HR PRN 06/06/19 03/30/20 History





[Ultracet Tablet]    


 


Cholecalciferol (Vitamin D3) 5,000 unit PO DAILY 06/08/19 03/30/20 History





[Vitamin D3]    


 


Cranberry Conc/C/Bacill Coag [Azo 2 tablet PO DAILY PRN 06/08/19 03/30/20 

History





Cranberry Tablet]    


 


Ferrous Sulfate [Iron] 125 mg PO DAILY 06/08/19 03/30/20 History


 


Multivit-Min/FA/Lycopen/Lutein 1 each PO DAILY 06/08/19 03/30/20 History





[Centrum Silver Tablet]    


 


Famotidine [Pepcid] 40 mg PO BID 01/25/20 03/30/20 History


 


Gabapentin [Neurontin] 100 mg PO QPM 01/25/20 03/30/20 History


 


Polyethylene Glycol 3350 [Miralax] 17 gm PO DAILY 01/25/20 03/30/20 History


 


Ascorbate Calcium/Bioflavonoid 1 tablet PO DAILY 03/30/20 03/30/20 History





[Veronica-C 1,000 mg Tablet]    


 


Cephalexin [Keflex] 500 mg PO BID 03/30/20 03/30/20 History


 


Krill Oil 500 mg PO DAILY 03/30/20 03/30/20 History


 


Lactobacillus Acidophilus 1 capsule PO DAILY 03/30/20 03/30/20 History





[Probiotic]    


 


Ondansetron [Zofran ODT] 4 mg PO Q6HR PRN 03/30/20 03/30/20 History


 


Psyllium Husk [Metamucil] 0.4 gm PO 03/30/20  History


 


Vit B12/Folic Acid/B6/Aa No.15 1 cap PO DAILY 03/30/20 03/30/20 History





[Glycotrol Capsule]    


 


Metoclopramide HCl [Reglan] 10 mg PO BID #60 tab 03/31/20  Rx


 


Mirtazapine [Remeron] 7.5 mg PO HS #30 tab 03/31/20  Rx











Past History: 


PMHx: Autoimmune encephalitis/meningitis, neurogenic bladder (self-

catheterization 4 past 25 to 30 years), HTN, HLD, HARISH (noncompliant with CPAP), 

RLS, RA, GERD, hypothyroidism   





PSHx: Cholecystectomy, hysterectomy, left total knee replacement (2017) with 

revision (2018), basal cell carcinoma of the nose   





FHx: Noncontributory to this case   





Social: Lives at home, 24-hour caregiver.  No history of smoking, illicit drug 

use or heavy alcohol intake.  She does not work.  Ambulates with a roller 

walker.








Hospitalist HPI ROS


Constitutional: denies: fever, chills


Respiratory: denies: cough, shortness of breath, hemoptysis, sputum, wheezing


Cardiovascular: denies: chest pain, palpitations, edema


Gastrointestinal: reports: nausea, vomiting.  denies: abdominal pain, diarrhea


Skin: denies: rash


Neurological: reports: weakness (Generalized).  denies: change in speech


All other systems reviewed; all pertinent +/- noted in HPI/Subj





Hospitalist Exam


General Appearance: NAD, awake alert.  negative: ill appearing


Eye: anicteric sclera


ENT: normocephalic atraumatic, moist mucosa


Neck: supple, no lymphadenopathy


Heart: RRR, no murmur, no gallops, no rubs, normal peripheral pulses


Respiratory: CTAB, no wheezes, no rales, no ronchi, no tachypnea


Gastrointestinal: soft, non-tender, non-distended, normal bowel sounds, no 

guarding, no rigidity


Extremities: no cyanosis, no edema


Skin: no rashes


Neurological: cranial nerve grossly intact, no focal deficits


Psychiatric: normal affect, A&O x 3





Hospitalist Results


Result Diagrams: 


                                 02/13/21 19:24





                                 02/13/21 19:24


Lab results: 


                             Laboratory Last Values











WBC  14.6 thou/uL (4.8-10.8)  H  02/13/21  19:24    


 


RBC  4.31 mill/uL (4.20-5.40)   02/13/21  19:24    


 


Hgb  11.6 g/dL (12.0-16.0)  L  02/13/21  19:24    


 


Hct  35.3 % (36.0-47.0)  L  02/13/21  19:24    


 


MCV  81.8 fL (78.0-98.0)   02/13/21  19:24    


 


MCH  26.9 pg (27.0-31.0)  L  02/13/21  19:24    


 


MCHC  32.8 g/dL (32.0-36.0)   02/13/21  19:24    


 


RDW  12.8 % (11.5-14.5)   02/13/21  19:24    


 


Plt Count  283 thou/uL (130-400)   02/13/21  19:24    


 


MPV  7.7 fL (7.4-10.4)   02/13/21  19:24    


 


Neutrophils % (Manual)  86 % (42-75)  H  02/13/21  19:24    


 


Band Neuts % (Manual)  6 % (5-11)   02/13/21  19:24    


 


Lymphocytes % (Manual)  6 % (21-51)  L  02/13/21  19:24    


 


Monocytes % (Manual)  2 % (0-10)   02/13/21  19:24    


 


Lymphocytes #  Not Reportable   02/13/21  19:24    


 


Sodium  135 mmol/L (136-145)  L  02/13/21  19:24    


 


Potassium  4.3 mmol/L (3.5-5.1)   02/13/21  19:24    


 


Chloride  97 mmol/L ()  L  02/13/21  19:24    


 


Carbon Dioxide  25 mmol/L (23-31)   02/13/21  19:24    


 


Anion Gap  17 mmol/L (10-20)   02/13/21  19:24    


 


BUN  24 mg/dL (9.8-20.1)  H  02/13/21  19:24    


 


Creatinine  1.66 mg/dL (0.6-1.1)  H  02/13/21  19:24    


 


Estimated GFR (MDRD)  30   02/13/21  19:24    


 


Glucose  172 mg/dL ()  H  02/13/21  19:24    


 


Calcium  9.4 mg/dL (7.8-10.44)   02/13/21  19:24    


 


Total Bilirubin  0.4 mg/dL (0.2-1.2)   02/13/21  19:24    


 


AST  19 U/L (5-34)   02/13/21  19:24    


 


ALT  20 U/L (8-55)   02/13/21  19:24    


 


Alkaline Phosphatase  58 U/L ()   02/13/21  19:24    


 


CK-MB (CK-2)  0.6 ng/mL (0-6.6)   02/13/21  19:24    


 


Troponin I  0.109 ng/mL (< 0.028)  H  02/13/21  19:24    


 


Serum Total Protein  6.6 g/dL (5.8-8.1)   02/13/21  19:24    


 


Albumin  3.8 g/dL (3.4-4.8)   02/13/21  19:24    


 


Globulin  2.8 g/dL (2.4-3.5)   02/13/21  19:24    


 


Albumin/Globulin Ratio  1.4 g/dL (1.2-2.2)   02/13/21  19:24    











  ** EKG


Status: image reviewed by me, report reviewed by me


Additional Comments: 





12 lead EKG interpreted by Emergency Department Physician at time of study, 12 

lead EKG shows normal sinus rhythm, Rate (beats per minute): 91, with no 

ectopics, Conduction normal, ST segments normal, T waves, inverted, Leads 

affected: I, Leads affected: aVl, Leads affected: V4, Leads affected: V5, Leads 

affected: V6, Axis normal, New T wave inversions compared to 3/26/2020.





  ** CT scan - abdomen


Status: report reviewed by me


Additional Comments: 





IMPRESSION: 


1. No acute abnormality in the abdomen or pelvis. 





  ** Chest x-ray


Status: report reviewed by me


Additional Comments: 





IMPRESSION: No acute cardiopulmonary process. Atherosclerosis





Hospitalist H&P A/P


(1) Elevated troponin


Code(s): R77.8 - OTHER SPECIFIED ABNORMALITIES OF PLASMA PROTEINS   Status: 

Acute   





(2) Nausea and vomiting


Code(s): R11.2 - NAUSEA WITH VOMITING, UNSPECIFIED   Status: Acute   


Qualifiers: 


   Vomiting type: bilious vomiting   Qualified Code(s): R11.14 - Bilious 

vomiting   





(3) Elevated random blood glucose level


Code(s): R73.09 - OTHER ABNORMAL GLUCOSE   Status: Acute   





(4) Leukocytosis


Code(s): D72.829 - ELEVATED WHITE BLOOD CELL COUNT, UNSPECIFIED   Status: Acute 

  





(5) HTN (hypertension)


Code(s): I10 - ESSENTIAL (PRIMARY) HYPERTENSION   Status: Chronic   





(6) HLD (hyperlipidemia)


Code(s): E78.5 - HYPERLIPIDEMIA, UNSPECIFIED   Status: Chronic   





(7) HARISH (obstructive sleep apnea)


Code(s): G47.33 - OBSTRUCTIVE SLEEP APNEA (ADULT) (PEDIATRIC)   Status: Chronic 

  





(8) Neurogenic bladder


Code(s): N31.9 - NEUROMUSCULAR DYSFUNCTION OF BLADDER, UNSPECIFIED   Status: 

Chronic   





(9) CKD (chronic kidney disease), stage III


Code(s): N18.30 - CHRONIC KIDNEY DISEASE, STAGE 3 UNSPECIFIED   Status: Chronic 

  





(10) Rheumatoid arthritis


Code(s): M06.9 - RHEUMATOID ARTHRITIS, UNSPECIFIED   Status: Chronic   





(11) Hypothyroidism


Code(s): E03.9 - HYPOTHYROIDISM, UNSPECIFIED   Status: Chronic   





(12) Depression


Code(s): F32.9 - MAJOR DEPRESSIVE DISORDER, SINGLE EPISODE, UNSPECIFIED   

Status: Chronic   





(13) Restless leg syndrome


Status: Chronic   


Plan: 





Patient with cardiovascular disease risk factors and autoimmune disorders 

presents for nausea and generalized weakness after receiving her second dose of 

the Covid vaccine when 24 hours.  Abdominal imaging unremarkable.  EKG new T 

wave inversions.  Initial troponin indeterminate.





#Nausea


Unclear etiology at this time.  


Developed less than 24 hours after receiving second dose of coronavirus vaccine.


Upon assessment, symptoms resolved.


Zofran as needed.





#Elevated troponin


Presented initial troponin 0.109


EKG new T wave inversions leads aVL, V4, V5, V6


Heart score 7


Given full dose aspirin in ED.


Upon assessment, symptoms resolved.


NPO.


Trend troponins, check BNP, TSH, mag, FLP


Continue aspirin, add nitro SL as needed


Echocardiogram.


Consult cardiology.





#Elevated random blood glucose


Presented 


On chronic steroids for RA.


Check hemoglobin A1c.





#Leukocytosis


Presented WBCs 14.6


On chronic steroids.


Check UA.





#Hypertension


Presented normotensive.


Patient medication list not updated per family.


Caregiver to bring home meds in a.m.





#HLD


Check FLP.


Awaiting reconciliation of home medication list.





#HARISH


Reports noncompliance with CPAP at home due to dry eyes.





#Neurogenic bladder


Self-catheterization (25-30 years)


Orders to self cath per usual routine.





#CKD stage III


Appears stable.





#Rheumatoid arthritis


Takes prednisone 8 mg daily.


Restart home dose prednisone.





#Hypothyroidism


Takes Synthroid 100 mcg daily.


Restart home dose Synthroid.


Check TSH.





#Depression


Denies SI/HI


Takes Lexapro at home.


We will restart home dose Lexapro.





#Restless leg syndrome


Takes gabapentin as needed at night.


Awaiting home med list reconciliation.





SCDs for DVT prophylaxis.


Pepcid for GI prophylaxis.


CODE STATUS is full code.


Discussed the case with attending physician, Dr. Finch, who is in plan of care.

## 2021-02-13 NOTE — RAD
Exam: Chest one view



HISTORY:Dyspnea. Emesis.



Comparison: 3/30/2020



FINDINGS:

Cardiac silhouette: Normal

Aorta: Atherosclerosis

Pulmonary vessels: Normal

Costophrenic angles: Clear



LUNGS: No masses or consolidation.



Pneumothorax: None



Osseous abnormalities: None



IMPRESSION: No acute cardiopulmonary process. Atherosclerosis.



Reported By: Jamil Ni 

Electronically Signed:  2/13/2021 8:42 PM

## 2021-02-14 NOTE — DIS
DATE OF ADMISSION:  02/13/2021



DATE OF DISCHARGE:  02/14/2021



DISCHARGE DISPOSITION:  To home.



PRIMARY DISCHARGE DIAGNOSES:  Generalized weakness with malaise and recent 
COVID-19

second shot placed 2 days back; moderate dehydration with acute kidney injury,

resolved; and indeterminate troponin due to demand ischemia type 2. 



SECONDARY DISCHARGE DIAGNOSES:  Hypertension, hypothyroidism, obstructive sleep

apnea, history of rheumatoid arthritis, history of autoimmune

meningitis/encephalitis.  Initial nausea and vomiting resolved. 



PROCEDURES DONE DURING HOSPITALIZATION:  Abdominal and pelvic CAT scan done 
showed

no acute abnormality in the abdomen or pelvis.  Chest x-ray done showed no acute

cardiopulmonary abnormality.  Initial white count of 14.  Discharge white count 
of

10.  H and H 10 and 32, platelet count 259, MCV 82 with 76% neutrophils.  
Discharge

BUN and creatinine 27 and 1.7.  .  Total cholesterol 170, triglycerides 
115,

, HDL 40.  Troponin indeterminate peaking up to 0.10, CK-MB 0.6.  COVID-
19

PCR was not detected on 02/14/2021. 



DISCHARGE MEDICATIONS:  

1. Lexapro 20 mg p.o. daily.

2. Metamucil daily.

3. MiraLAX 17 g daily.

4. Neurontin 100 mg p.o. q.p.m.

5. Pepcid 40 mg twice daily.

6. Prednisone 10 mg daily.

7. Probiotic one capsule daily.

8. Levothyroxine 100 mcg p.o. daily.

9. Vitamin D3, 5000 units daily.

10. Aspirin 81 mg daily.

11. Losartan 50 mg daily to restart on 02/16/2021.



ALLERGIES:  TO LEVAQUIN, SULFA.



DISCHARGE PLAN:  The patient to follow up with her primary care physician, Dr. Cadet in 1 week.  She needs to follow up with Dr. Holland in 2 weeks. 



BRIEF COURSE DURING HOSPITALIZATION:  The patient initially came in with 
complaints

of generalized weakness and malaise.  She had her second shot of COVID vaccine 
on

the 12th and had not been feeling well since then.  She had severe nausea and 
had

one episode of bilious vomiting.  No abdominal pain or diarrhea.  She had 
initial

CAT scan done without contrast in the ER, which did not reveal any acute

abnormality.  She had moderate dehydration with acute kidney injury for which 
fluid

resuscitation was done.  The patient had mild indeterminate troponin peaking up 
to

0.1, likely demand ischemia from above.  The patient has not had any chest pain

during her stay here.  She is tolerating oral solid diet this morning.  The 
patient

has been advised to hold her Cozaar until 16, then restart.  A repeat metabolic

panel to be done on the 16th and the patient to follow up with her primary care

physician next day.  I have given complete updates to the patient and her family
at

bedside.  She is hemodynamically stable and is eager to go home.  I have 
discussed

her findings with Dr. Holland.  She will be happy to follow in the outpatient 
setting

in the following 2 weeks.  Please note, I have seen and examined the patient on 
the

day of discharge. 







Job ID:  [id number]



MTDD